# Patient Record
Sex: FEMALE | Race: WHITE | Employment: OTHER | URBAN - METROPOLITAN AREA
[De-identification: names, ages, dates, MRNs, and addresses within clinical notes are randomized per-mention and may not be internally consistent; named-entity substitution may affect disease eponyms.]

---

## 2022-01-18 ENCOUNTER — HOSPITAL ENCOUNTER (EMERGENCY)
Age: 52
Discharge: HOME | End: 2022-01-19
Attending: EMERGENCY MEDICINE
Payer: COMMERCIAL

## 2022-01-18 ENCOUNTER — APPOINTMENT (OUTPATIENT)
Dept: GENERAL RADIOLOGY | Age: 52
End: 2022-01-18
Payer: COMMERCIAL

## 2022-01-18 VITALS
TEMPERATURE: 98.9 F | DIASTOLIC BLOOD PRESSURE: 87 MMHG | SYSTOLIC BLOOD PRESSURE: 128 MMHG | RESPIRATION RATE: 22 BRPM | WEIGHT: 191 LBS | HEART RATE: 88 BPM | HEIGHT: 68 IN | BODY MASS INDEX: 28.95 KG/M2 | OXYGEN SATURATION: 94 %

## 2022-01-18 DIAGNOSIS — U07.1 COVID-19: Primary | ICD-10-CM

## 2022-01-18 LAB
ABSOLUTE EOS #: 0.07 K/UL (ref 0–0.44)
ABSOLUTE IMMATURE GRANULOCYTE: <0.03 K/UL (ref 0–0.3)
ABSOLUTE LYMPH #: 1.18 K/UL (ref 1.1–3.7)
ABSOLUTE MONO #: 0.41 K/UL (ref 0.1–1.2)
ALBUMIN SERPL-MCNC: 4.8 G/DL (ref 3.5–5.2)
ALBUMIN/GLOBULIN RATIO: 2.3 (ref 1–2.5)
ALP BLD-CCNC: 99 U/L (ref 35–104)
ALT SERPL-CCNC: 26 U/L (ref 5–33)
ANION GAP SERPL CALCULATED.3IONS-SCNC: 12 MMOL/L (ref 9–17)
AST SERPL-CCNC: 20 U/L
BASOPHILS # BLD: 1 % (ref 0–2)
BASOPHILS ABSOLUTE: <0.03 K/UL (ref 0–0.2)
BILIRUB SERPL-MCNC: 0.32 MG/DL (ref 0.3–1.2)
BUN BLDV-MCNC: 17 MG/DL (ref 6–20)
BUN/CREAT BLD: ABNORMAL (ref 9–20)
CALCIUM SERPL-MCNC: 9.5 MG/DL (ref 8.6–10.4)
CHLORIDE BLD-SCNC: 103 MMOL/L (ref 98–107)
CO2: 25 MMOL/L (ref 20–31)
CREAT SERPL-MCNC: 0.78 MG/DL (ref 0.5–0.9)
D-DIMER QUANTITATIVE: 0.24 MG/L FEU
DIFFERENTIAL TYPE: NORMAL
EOSINOPHILS RELATIVE PERCENT: 2 % (ref 1–4)
GFR AFRICAN AMERICAN: >60 ML/MIN
GFR NON-AFRICAN AMERICAN: >60 ML/MIN
GFR SERPL CREATININE-BSD FRML MDRD: ABNORMAL ML/MIN/{1.73_M2}
GFR SERPL CREATININE-BSD FRML MDRD: ABNORMAL ML/MIN/{1.73_M2}
GLUCOSE BLD-MCNC: 141 MG/DL (ref 70–99)
HCT VFR BLD CALC: 41.1 % (ref 36.3–47.1)
HEMOGLOBIN: 13.9 G/DL (ref 11.9–15.1)
IMMATURE GRANULOCYTES: 0 %
LYMPHOCYTES # BLD: 32 % (ref 24–43)
MCH RBC QN AUTO: 29.6 PG (ref 25.2–33.5)
MCHC RBC AUTO-ENTMCNC: 33.8 G/DL (ref 28.4–34.8)
MCV RBC AUTO: 87.6 FL (ref 82.6–102.9)
MONOCYTES # BLD: 11 % (ref 3–12)
NRBC AUTOMATED: 0 PER 100 WBC
PDW BLD-RTO: 12.3 % (ref 11.8–14.4)
PLATELET # BLD: 215 K/UL (ref 138–453)
PLATELET ESTIMATE: NORMAL
PMV BLD AUTO: 10.1 FL (ref 8.1–13.5)
POTASSIUM SERPL-SCNC: 3.9 MMOL/L (ref 3.7–5.3)
RBC # BLD: 4.69 M/UL (ref 3.95–5.11)
RBC # BLD: NORMAL 10*6/UL
SARS-COV-2, RAPID: DETECTED
SEG NEUTROPHILS: 54 % (ref 36–65)
SEGMENTED NEUTROPHILS ABSOLUTE COUNT: 1.97 K/UL (ref 1.5–8.1)
SODIUM BLD-SCNC: 140 MMOL/L (ref 135–144)
SPECIMEN DESCRIPTION: ABNORMAL
TOTAL PROTEIN: 6.9 G/DL (ref 6.4–8.3)
TROPONIN INTERP: NORMAL
TROPONIN T: NORMAL NG/ML
TROPONIN, HIGH SENSITIVITY: <6 NG/L (ref 0–14)
WBC # BLD: 3.7 K/UL (ref 3.5–11.3)
WBC # BLD: NORMAL 10*3/UL

## 2022-01-18 PROCEDURE — 84484 ASSAY OF TROPONIN QUANT: CPT

## 2022-01-18 PROCEDURE — 85379 FIBRIN DEGRADATION QUANT: CPT

## 2022-01-18 PROCEDURE — 87635 SARS-COV-2 COVID-19 AMP PRB: CPT

## 2022-01-18 PROCEDURE — 6360000002 HC RX W HCPCS: Performed by: STUDENT IN AN ORGANIZED HEALTH CARE EDUCATION/TRAINING PROGRAM

## 2022-01-18 PROCEDURE — 80053 COMPREHEN METABOLIC PANEL: CPT

## 2022-01-18 PROCEDURE — 96375 TX/PRO/DX INJ NEW DRUG ADDON: CPT

## 2022-01-18 PROCEDURE — 2580000003 HC RX 258: Performed by: STUDENT IN AN ORGANIZED HEALTH CARE EDUCATION/TRAINING PROGRAM

## 2022-01-18 PROCEDURE — 71045 X-RAY EXAM CHEST 1 VIEW: CPT

## 2022-01-18 PROCEDURE — 96374 THER/PROPH/DIAG INJ IV PUSH: CPT

## 2022-01-18 PROCEDURE — 85025 COMPLETE CBC W/AUTO DIFF WBC: CPT

## 2022-01-18 PROCEDURE — 93005 ELECTROCARDIOGRAM TRACING: CPT | Performed by: STUDENT IN AN ORGANIZED HEALTH CARE EDUCATION/TRAINING PROGRAM

## 2022-01-18 PROCEDURE — 99285 EMERGENCY DEPT VISIT HI MDM: CPT

## 2022-01-18 PROCEDURE — 6370000000 HC RX 637 (ALT 250 FOR IP): Performed by: STUDENT IN AN ORGANIZED HEALTH CARE EDUCATION/TRAINING PROGRAM

## 2022-01-18 RX ORDER — FENTANYL CITRATE 50 UG/ML
25 INJECTION, SOLUTION INTRAMUSCULAR; INTRAVENOUS ONCE
Status: COMPLETED | OUTPATIENT
Start: 2022-01-18 | End: 2022-01-18

## 2022-01-18 RX ORDER — ASPIRIN 81 MG/1
324 TABLET, CHEWABLE ORAL ONCE
Status: COMPLETED | OUTPATIENT
Start: 2022-01-18 | End: 2022-01-18

## 2022-01-18 RX ORDER — MORPHINE SULFATE 4 MG/ML
2 INJECTION, SOLUTION INTRAMUSCULAR; INTRAVENOUS ONCE
Status: DISCONTINUED | OUTPATIENT
Start: 2022-01-18 | End: 2022-01-18

## 2022-01-18 RX ORDER — BIOTIN 10 MG
1 TABLET ORAL DAILY
Qty: 30 TABLET | Refills: 0 | Status: SHIPPED | OUTPATIENT
Start: 2022-01-18

## 2022-01-18 RX ORDER — ONDANSETRON 2 MG/ML
4 INJECTION INTRAMUSCULAR; INTRAVENOUS ONCE
Status: COMPLETED | OUTPATIENT
Start: 2022-01-18 | End: 2022-01-18

## 2022-01-18 RX ORDER — BENZONATATE 100 MG/1
100 CAPSULE ORAL 3 TIMES DAILY PRN
Qty: 21 CAPSULE | Refills: 0 | Status: SHIPPED | OUTPATIENT
Start: 2022-01-18 | End: 2022-01-25

## 2022-01-18 RX ORDER — 0.9 % SODIUM CHLORIDE 0.9 %
1000 INTRAVENOUS SOLUTION INTRAVENOUS ONCE
Status: COMPLETED | OUTPATIENT
Start: 2022-01-18 | End: 2022-01-18

## 2022-01-18 RX ORDER — 0.9 % SODIUM CHLORIDE 0.9 %
500 INTRAVENOUS SOLUTION INTRAVENOUS ONCE
Status: DISCONTINUED | OUTPATIENT
Start: 2022-01-18 | End: 2022-01-19

## 2022-01-18 RX ORDER — OXYCODONE HYDROCHLORIDE AND ACETAMINOPHEN 5; 325 MG/1; MG/1
1 TABLET ORAL ONCE
Status: COMPLETED | OUTPATIENT
Start: 2022-01-18 | End: 2022-01-18

## 2022-01-18 RX ORDER — LIDOCAINE 50 MG/G
1 PATCH TOPICAL DAILY
Qty: 30 PATCH | Refills: 0 | Status: SHIPPED | OUTPATIENT
Start: 2022-01-18

## 2022-01-18 RX ORDER — GUAIFENESIN/DEXTROMETHORPHAN 100-10MG/5
10 SYRUP ORAL 3 TIMES DAILY PRN
Qty: 118 ML | Refills: 0 | Status: SHIPPED | OUTPATIENT
Start: 2022-01-18 | End: 2022-01-28

## 2022-01-18 RX ORDER — ACETAMINOPHEN 500 MG
500 TABLET ORAL 4 TIMES DAILY PRN
Qty: 40 TABLET | Refills: 0 | Status: SHIPPED | OUTPATIENT
Start: 2022-01-18 | End: 2022-01-28

## 2022-01-18 RX ADMIN — FENTANYL CITRATE 25 MCG: 50 INJECTION, SOLUTION INTRAMUSCULAR; INTRAVENOUS at 21:27

## 2022-01-18 RX ADMIN — ONDANSETRON 4 MG: 2 INJECTION INTRAMUSCULAR; INTRAVENOUS at 21:27

## 2022-01-18 RX ADMIN — SODIUM CHLORIDE 1000 ML: 9 INJECTION, SOLUTION INTRAVENOUS at 21:49

## 2022-01-18 RX ADMIN — OXYCODONE HYDROCHLORIDE AND ACETAMINOPHEN 1 TABLET: 5; 325 TABLET ORAL at 22:47

## 2022-01-18 RX ADMIN — ASPIRIN 324 MG: 81 TABLET, CHEWABLE ORAL at 21:27

## 2022-01-18 ASSESSMENT — ENCOUNTER SYMPTOMS
CONSTIPATION: 0
COUGH: 1
BACK PAIN: 1
RHINORRHEA: 1
NAUSEA: 1
DIARRHEA: 0
ABDOMINAL PAIN: 0
VOMITING: 0
SHORTNESS OF BREATH: 1
SORE THROAT: 0

## 2022-01-18 ASSESSMENT — PAIN DESCRIPTION - DESCRIPTORS: DESCRIPTORS: CONSTANT

## 2022-01-18 ASSESSMENT — PAIN SCALES - GENERAL
PAINLEVEL_OUTOF10: 6
PAINLEVEL_OUTOF10: 7
PAINLEVEL_OUTOF10: 8

## 2022-01-18 ASSESSMENT — PAIN DESCRIPTION - LOCATION: LOCATION: BACK;CHEST

## 2022-01-19 ENCOUNTER — TELEPHONE (OUTPATIENT)
Dept: FAMILY MEDICINE CLINIC | Age: 52
End: 2022-01-19

## 2022-01-19 NOTE — ED PROVIDER NOTES
[facility]  ED  Emergency Department Encounter  Emergency Medicine Resident     Pt Name: Trey De Los Santos  MRN: [id number]  Celinagfmarguerite 1970  Date of evaluation: 1/18/22  PCP:  MALCOLM Reyna CNP    CHIEF COMPLAINT       Chief Complaint   Patient presents with    Chest Pain     stated she has been hurting since this morning    Back Pain       HISTORY OFPRESENT ILLNESS  (Location/Symptom, Timing/Onset, Context/Setting, Quality, Duration, Modifying Factors,Severity.)      Trey De Los Santos is a 46 y.o. female with past medical history of hypertension and multiple sclerosis who presents with fever, headache, rhinorrhea, congestion, myalgias, nausea and shortness of breath for the past 5 days. Patient reports that she has been treating herself at home with over-the-counter medications and doing well until this morning when she woke up with chest pain. She describes the pain as pressure from both the front of her chest and her back. States the pain has been present all day so she decided to present to the emergency department for evaluation. She denies prior cardiac or lung history. She does report recent COVID exposure to her son. Patient is not vaccinated for COVID. She also reports a history of DVT for which she was on anticoagulation for 1 year but has not been on it since. She denies history of PE, cancer, recent surgery or other prolonged immobilization. She is not taking hormones. She is not a current smoker    PAST MEDICAL / SURGICAL / SOCIAL / FAMILY HISTORY      has a past medical history of Anxiety, Depression, Hypertension, Kidney stones, Multiple sclerosis ([address] and Osteoporosis. has a past surgical history that includes Hysterectomy (2005); Tonsillectomy and adenoidectomy (1978); Cholecystectomy (2000); Appendectomy (2001); and Kidney stone surgery (2014). Social:  reports that she is a non-smoker but has been exposed to tobacco smoke.  She has never used smokeless tobacco. She reports that she does not drink alcohol and does not use drugs. Family Hx:   Family History   Problem Relation Age of Onset    Hypertension Mother     Diabetes Mother     Thyroid Disease Mother     Mult Sclerosis Father     Kidney Disease Maternal Grandmother         Allergies:  Bee venom, Tigan [trimethobenzamide hcl], Toprol xl [metoprolol], Toradol [ketorolac tromethamine], and Imitrex [sumatriptan]    Home Medications:  Prior to Admission medications    Medication Sig Start Date End Date Taking? Authorizing Provider   acetaminophen (TYLENOL) 500 MG tablet Take 1 tablet by mouth 4 times daily as needed for Pain 1/18/22 1/28/22 Yes Omari Rogel, DO   guaiFENesin-dextromethorphan (ROBITUSSIN DM) 100-10 MG/5ML syrup Take 10 mLs by mouth 3 times daily as needed for Cough 1/18/22 1/28/22 Yes Omari Rogel, DO   benzonatate (TESSALON PERLES) 100 MG capsule Take 1 capsule by mouth 3 times daily as needed for Cough 1/18/22 1/25/22 Yes Arabella Senior, DO   lidocaine (LIDODERM) 5 % Place 1 patch onto the skin daily 12 hours on, 12 hours off. 1/18/22  Yes Omari Rogel, DO   Multiple Vitamins-Minerals (MULTIVITAMIN ADULT) CHEW Take 1 tablet by mouth daily 1/18/22  Yes Omari Rogel, DO   vitamin D (CHOLECALCIFEROL) 1000 UNIT TABS tablet Take 1,000 Units by mouth daily    Historical Provider, MD   lisinopril (PRINIVIL;ZESTRIL) 20 MG tablet   Take 20 mg by mouth 2 times daily  4/22/15   Historical Provider, MD   amphetamine-dextroamphetamine (ADDERALL, 10MG,) 10 MG tablet   Take 20 mg by mouth daily     Historical Provider, MD   escitalopram (LEXAPRO) 10 MG tablet Take 10 mg by mouth 2 times daily. Historical Provider, MD       REVIEW OFSYSTEMS    (2-9 systems for level 4, 10 or more for level 5)      Review of Systems   Constitutional: Positive for appetite change, fatigue and fever. Negative for chills. HENT: Positive for congestion and rhinorrhea. Negative for sore throat.     Eyes: Negative for visual disturbance. Respiratory: Positive for cough and shortness of breath. Cardiovascular: Positive for chest pain. Negative for leg swelling. Gastrointestinal: Positive for nausea. Negative for abdominal pain, constipation, diarrhea and vomiting. Genitourinary: Negative for dysuria, frequency and hematuria. Musculoskeletal: Positive for back pain and myalgias. Negative for arthralgias and neck pain. Skin: Negative for rash. Neurological: Negative for weakness, light-headedness, numbness and headaches. Psychiatric/Behavioral: Negative for confusion. All other systems reviewed and are negative. PHYSICAL EXAM   (up to 7 for level 4, 8 or more forlevel 5)      INITIAL VITALS:   Vitals:    01/18/22 2036 01/18/22 2150 01/18/22 2158 01/18/22 2240   BP: 122/81  128/87    Pulse: 107 102 88    Resp: 18  22    Temp: 100.8 °F (38.2 °C)   98.9 °F (37.2 °C)   TempSrc:    Oral   SpO2: 95%  94%    Weight: 191 lb (86.6 kg)      Height: 5' 8\" (1.727 m)           Physical Exam  Vitals and nursing note reviewed. Constitutional:       General: She is not in acute distress. Appearance: She is not toxic-appearing. Comments: Adult female sitting in stretcher no acute distress. Speaks in full sentences and answers questions appropriately. HENT:      Head: Normocephalic and atraumatic. Nose: Nose normal.      Mouth/Throat:      Mouth: Mucous membranes are moist.      Pharynx: Oropharynx is clear. Eyes:      Conjunctiva/sclera: Conjunctivae normal.      Pupils: Pupils are equal, round, and reactive to light. Cardiovascular:      Rate and Rhythm: Regular rhythm. Tachycardia present. Heart sounds: No murmur heard. No friction rub. No gallop. Pulmonary:      Effort: No respiratory distress. Breath sounds: Normal breath sounds. No wheezing, rhonchi or rales. Comments: Mild tachypnea but clear breath sounds throughout  Abdominal:      General: Abdomen is flat.  There is no distension. Palpations: Abdomen is soft. Tenderness: There is no abdominal tenderness. Musculoskeletal:         General: No swelling or tenderness. Cervical back: Neck supple. No rigidity. Right lower leg: No edema. Left lower leg: No edema. Skin:     General: Skin is warm and dry. Capillary Refill: Capillary refill takes less than 2 seconds. Findings: No rash. Neurological:      General: No focal deficit present. Mental Status: She is alert. Psychiatric:         Mood and Affect: Mood normal.         Behavior: Behavior normal.         DIFFERENTIAL  DIAGNOSIS     DDX: COVID-19, other viral illness, pneumonia, pleural effusion, pulmonary edema, pulmonary embolism, ACS, musculoskeletal pain, electrolyte abnormality, dehydration, MS flare    Initial MDM/Plan: 46 y.o. female with past medical history of hypertension and multiple sclerosis who presents with fever, headache, rhinorrhea, congestion, myalgias, nausea and shortness of breath for the past 5 days with new onset chest pain today. Patient had recent COVID exposure with her son. On physical exam, patient is awake, alert, nontoxic-appearing. Her vitals are remarkable for tachycardia and temp of 100.8F. O2 sats ranging from 94-98% on room air. Lungs are clear. Suspect COVID-19. However, given chest pain, will obtain cardiac work-up including EKG, troponin and chest x-ray. Also obtain D-dimer to evaluate for pulmonary embolism. Will treat symptomatically and reassess    DIAGNOSTIC RESULTS / EMERGENCYDEPARTMENT COURSE / MDM     LABS:  Results for orders placed or performed during the hospital encounter of 01/18/22   COVID-19, Rapid    Specimen: Nasopharyngeal Swab   Result Value Ref Range    Specimen Description . NASOPHARYNGEAL SWAB     SARS-CoV-2, Rapid DETECTED (A) Not Detected   CBC Auto Differential   Result Value Ref Range    WBC 3.7 3.5 - 11.3 k/uL    RBC 4.69 3.95 - 5.11 m/uL    Hemoglobin 13.9 11.9 - 15.1 g/dL    Hematocrit 41.1 36.3 - 47.1 %    MCV 87.6 82.6 - 102.9 fL    MCH 29.6 25.2 - 33.5 pg    MCHC 33.8 28.4 - 34.8 g/dL    RDW 12.3 11.8 - 14.4 %    Platelets 920 184 - 034 k/uL    MPV 10.1 8.1 - 13.5 fL    NRBC Automated 0.0 0.0 per 100 WBC    Differential Type NOT REPORTED     Seg Neutrophils 54 36 - 65 %    Lymphocytes 32 24 - 43 %    Monocytes 11 3 - 12 %    Eosinophils % 2 1 - 4 %    Basophils 1 0 - 2 %    Immature Granulocytes 0 0 %    Segs Absolute 1.97 1.50 - 8.10 k/uL    Absolute Lymph # 1.18 1.10 - 3.70 k/uL    Absolute Mono # 0.41 0.10 - 1.20 k/uL    Absolute Eos # 0.07 0.00 - 0.44 k/uL    Basophils Absolute <0.03 0.00 - 0.20 k/uL    Absolute Immature Granulocyte <0.03 0.00 - 0.30 k/uL    WBC Morphology NOT REPORTED     RBC Morphology NOT REPORTED     Platelet Estimate NOT REPORTED    Comprehensive Metabolic Panel w/ Reflex to MG   Result Value Ref Range    Glucose 141 (H) 70 - 99 mg/dL    BUN 17 6 - 20 mg/dL    CREATININE 0.78 0.50 - 0.90 mg/dL    Bun/Cre Ratio NOT REPORTED 9 - 20    Calcium 9.5 8.6 - 10.4 mg/dL    Sodium 140 135 - 144 mmol/L    Potassium 3.9 3.7 - 5.3 mmol/L    Chloride 103 98 - 107 mmol/L    CO2 25 20 - 31 mmol/L    Anion Gap 12 9 - 17 mmol/L    Alkaline Phosphatase 99 35 - 104 U/L    ALT 26 5 - 33 U/L    AST 20 <32 U/L    Total Bilirubin 0.32 0.3 - 1.2 mg/dL    Total Protein 6.9 6.4 - 8.3 g/dL    Albumin 4.8 3.5 - 5.2 g/dL    Albumin/Globulin Ratio 2.3 1.0 - 2.5    GFR Non-African American >60 >60 mL/min    GFR African American >60 >60 mL/min    GFR Comment          GFR Staging NOT REPORTED    D-Dimer, Quantitative   Result Value Ref Range    D-Dimer, Quant 0.24 mg/L FEU   Troponin   Result Value Ref Range    Troponin, High Sensitivity <6 0 - 14 ng/L    Troponin T NOT REPORTED <0.03 ng/mL    Troponin Interp NOT REPORTED          RADIOLOGY:  XR CHEST PORTABLE    Result Date: 1/18/2022  EXAMINATION: ONE XRAY VIEW OF THE CHEST 1/18/2022 9:30 pm COMPARISON: None.  HISTORY: ORDERING SYSTEM PROVIDED HISTORY: chest pain TECHNOLOGIST PROVIDED HISTORY: chest pain FINDINGS: The lungs are without acute focal process. There is no effusion or pneumothorax. The cardiomediastinal silhouette is without acute process. The osseous structures are without acute process. No acute process. EKG  Sinus tachycardia, rate: 102  UT: 140  QRS: 1  QT/QTc: 344/440  No ST elevation or depression  No T wave abnormality      All EKG's are interpreted by the Emergency Department Physician who either signs or Co-signs this chart in the absence of a cardiologist.    MEDICATIONS ORDERED:  Orders Placed This Encounter   Medications    0.9 % sodium chloride bolus    aspirin chewable tablet 324 mg    ondansetron (ZOFRAN) injection 4 mg    fentaNYL (SUBLIMAZE) injection 25 mcg    DISCONTD: morphine injection 2 mg    oxyCODONE-acetaminophen (PERCOCET) 5-325 MG per tablet 1 tablet    DISCONTD: 0.9 % sodium chloride bolus    acetaminophen (TYLENOL) 500 MG tablet     Sig: Take 1 tablet by mouth 4 times daily as needed for Pain     Dispense:  40 tablet     Refill:  0    guaiFENesin-dextromethorphan (ROBITUSSIN DM) 100-10 MG/5ML syrup     Sig: Take 10 mLs by mouth 3 times daily as needed for Cough     Dispense:  118 mL     Refill:  0    benzonatate (TESSALON PERLES) 100 MG capsule     Sig: Take 1 capsule by mouth 3 times daily as needed for Cough     Dispense:  21 capsule     Refill:  0    lidocaine (LIDODERM) 5 %     Sig: Place 1 patch onto the skin daily 12 hours on, 12 hours off.      Dispense:  30 patch     Refill:  0    Multiple Vitamins-Minerals (MULTIVITAMIN ADULT) CHEW     Sig: Take 1 tablet by mouth daily     Dispense:  30 tablet     Refill:  0         PROCEDURES:  None      CONSULTS:  None      EMERGENCY DEPARTMENT COURSE:  ED Course as of 01/19/22 0527 Tue Jan 18, 2022 2148 WBC: 3.7 [KA]   2154 SARS-CoV-2, Rapid(!): DETECTED [KA]   2159 D-Dimer, Quant: 0.24 [KA]   2232 Troponin, High Sensitivity: <6 [BV]   9580 Patient reevaluated. Reports pain in chest has improved. O2 sat still 94-98% on room air. Tachycardia improved heart rate now in the 80s. Marched patient around room and she did not have any changes in vitals, however did report increased pain in her back. She is now reporting left flank pain which she feels is similar to her previous kidney stones which have required surgical removal.  On exam, patient does have left flank tenderness to percussion. She has an allergy to Toradol so we will treat with morphine. Will obtain urinalysis. If urine is concerning for an infected stone, will discuss risk and benefits of admission and start the patient on antibiotics. [KA]   Wed Jan 19, 2022   0022 Unable to get urine. She was bladder scanned and had to 226 cc of urine in her bladder. Patient was offered he will continue to [facility] emergency department and how she urinated versus discharge for straight cath. Patient elected to be discharged at this time with follow-up with her primary care physician and strict return precautions to return to the emergency department for any worsening symptoms, especially decreased urination, hematuria, flank pain, fevers that do not decrease with Tylenol and nausea vomiting. Discussed with patient that I think an infected stone is an unlikely cause of her symptoms at this time, as her heart rate and temperature improved significantly with treatment here. Her symptoms are more likely due to COVID.    [KA]      ED Course User Index  [KA] David Humphrey DO          FINAL IMPRESSION      1. COVID-19          DISPOSITION / PLAN     DISPOSITION  Discharge      PATIENT REFERRED TO:  Iman Germain, APRN - CNP  [address] [address]  [phone]    Schedule an appointment as soon as possible for a visit       [facility] ED  [address] [place] [address]  [phone]    If symptoms worsen      DISCHARGE MEDICATIONS:  Discharge Medication List as of 1/19/2022 12:24 AM      START taking these medications    Details   acetaminophen (TYLENOL) 500 MG tablet Take 1 tablet by mouth 4 times daily as needed for Pain, Disp-40 tablet, R-0Print      guaiFENesin-dextromethorphan (ROBITUSSIN DM) 100-10 MG/5ML syrup Take 10 mLs by mouth 3 times daily as needed for Cough, Disp-118 mL, R-0Print      benzonatate (TESSALON PERLES) 100 MG capsule Take 1 capsule by mouth 3 times daily as needed for Cough, Disp-21 capsule, R-0Print      lidocaine (LIDODERM) 5 % Place 1 patch onto the skin daily 12 hours on, 12 hours off., Disp-30 patch, R-0Print      Multiple Vitamins-Minerals (MULTIVITAMIN ADULT) CHEW Take 1 tablet by mouth daily, Disp-30 tablet, R-0Print             Cecilia Whitlock DO  Emergency Medicine Resident    (Please note that portions of this note were completed with a voice recognition program.Efforts were made to edit the dictations but occasionally words are mis-transcribed.)        Cecilia Whitlock DO  Resident  01/19/22 7403

## 2022-01-19 NOTE — TELEPHONE ENCOUNTER
[facility] ([facility]) ED Follow up Call    Reason for ED visit:  CHEST PAIN, BACK PAIN       FU appts/Provider:    No future appointments. VOICEMAIL DOCUMENTATION - ERASE IF NOT USED  Hi, this message is for  ALBER  This is MELANIA from [address] [place] [address] office. Just calling to see how you are doing after your recent visit to the Emergency Room. [address] [place] [address] wants to make sure you were able to fill any prescriptions and that you understand your discharge instructions. Please return our call if you need to make a follow up appointment with your provider or have any further needs. Our phone number is [phone]*. Have a great day.

## 2022-01-19 NOTE — ED PROVIDER NOTES
[facility]     Emergency Department     Faculty Attestation    I performed a history and physical examination of the patient and discussed management with the resident. I reviewed the residents note and agree with the documented findings and plan of care. Any areas of disagreement are noted on the chart. I was personally present for the key portions of any procedures. I have documented in the chart those procedures where I was not present during the key portions. I have reviewed the emergency nurses triage note. I agree with the chief complaint, past medical history, past surgical history, allergies, medications, social and family history as documented unless otherwise noted below. For Physician Assistant/ Nurse Practitioner cases/documentation I have personally evaluated this patient and have completed at least one if not all key elements of the E/M (history, physical exam, and MDM). Additional findings are as noted. I have personally seen and evaluated the patient. I find the patient's history and physical exam are consistent with the NP/PA documentation. I agree with the care provided, treatment rendered, disposition and follow-up plan. This patient was evaluated in the Emergency Department for symptoms described in the history of present illness. The patient was evaluated in the context of the global COVID-19 pandemic, which necessitated consideration that the patient might be at risk for infection with the SARS-CoV-2 virus that causes COVID-19. Institutional protocols and algorithms that pertain to the evaluation of patients at risk for COVID-19 are in a state of rapid change based on information released by regulatory bodies including the CDC and federal and state organizations. These policies and algorithms were followed during the patient's care in the ED. 12-year-old female with hypertension and multiple sclerosis presenting with concerns for COVID.   Recent exposure to her son. Not vaccinated. Doing well at home over the last several days, using over-the-counter medications, however woke up with chest pressure that feels like it goes through to her back. It did not improve throughout the day. She has also lost taste and smell senses. Exam:  General: Laying on the bed, awake, alert and in no acute distress  CV: normal rate and regular rhythm  Lungs: Breathing comfortably on room air with no tachypnea, hypoxia, or increased work of breathing    Plan:  COVID swab, cardiac work-up, D-dimer. Patient positive for COVID. X-ray with no pneumonia. D-dimer not elevated. Troponin negative. Patient with no hypoxia, safe for discharge home from [address] [place] [address]. Patient started having flank pain during work-up, awaiting urine as patient has had kidney stones in the past.  Signed out to overnight physician pending urine results.         Aida Banegas MD   Attending Emergency  Physician    (Please note that portions of this note were completed with a voice recognition program. Efforts were made to edit the dictations but occasionally words are mis-transcribed.)              Aida Banegas MD  01/18/22 5922

## 2022-01-19 NOTE — ED PROVIDER NOTES
Faculty Sign-Out Attestation  Handoff taken on the following patient from prior Attending Physician: Flip Frey    I was available and discussed any additional care issues that arose and coordinated the management plans with the resident(s) caring for the patient during my duty period. Any areas of disagreement with residents documentation of care or procedures are noted on the chart. I was personally present for the key portions of any/all procedures during my duty period. I have documented in the chart those procedures where I was not present during the key portions.     Cp / covid +, d dimer-, ua pending,   Will plan to discharge    Laurie Lang DO  Attending Physician     Laurie Lang,   01/18/22 6605    Pt declines urine, risk discussed,   Pt discharged, [address], DO  01/19/22 9917

## 2022-01-19 NOTE — ED NOTES
Pt ambulatory to ED 18. Pt is a.o x4. Pt c/o chest pressure, fatigue, cough, fever, loss of smell/taste. Pt put on cardiac monitor. IV established, EKG, covid swab and labs obtained. Call light within reach. NAD noted. Will continue to monitor.      Alfonso Buitrago RN  01/18/22 7985

## 2022-01-20 LAB
EKG ATRIAL RATE: 102 BPM
EKG P AXIS: 17 DEGREES
EKG P-R INTERVAL: 144 MS
EKG Q-T INTERVAL: 344 MS
EKG QRS DURATION: 102 MS
EKG QTC CALCULATION (BAZETT): 448 MS
EKG R AXIS: -12 DEGREES
EKG T AXIS: 45 DEGREES
EKG VENTRICULAR RATE: 102 BPM

## 2022-01-20 PROCEDURE — 93010 ELECTROCARDIOGRAM REPORT: CPT | Performed by: INTERNAL MEDICINE

## 2022-11-12 ENCOUNTER — HOSPITAL ENCOUNTER (EMERGENCY)
Age: 52
Discharge: HOME | End: 2022-11-12
Attending: EMERGENCY MEDICINE
Payer: COMMERCIAL

## 2022-11-12 VITALS
DIASTOLIC BLOOD PRESSURE: 93 MMHG | HEART RATE: 80 BPM | RESPIRATION RATE: 18 BRPM | BODY MASS INDEX: 30.31 KG/M2 | WEIGHT: 200 LBS | SYSTOLIC BLOOD PRESSURE: 141 MMHG | OXYGEN SATURATION: 99 % | HEIGHT: 68 IN | TEMPERATURE: 99 F

## 2022-11-12 DIAGNOSIS — K25.9 GASTRIC ULCER WITHOUT HEMORRHAGE OR PERFORATION, UNSPECIFIED CHRONICITY: ICD-10-CM

## 2022-11-12 DIAGNOSIS — R19.7 DIARRHEA, UNSPECIFIED TYPE: Primary | ICD-10-CM

## 2022-11-12 DIAGNOSIS — R10.13 EPIGASTRIC PAIN: ICD-10-CM

## 2022-11-12 LAB
ABSOLUTE EOS #: 0.09 K/UL (ref 0–0.44)
ABSOLUTE IMMATURE GRANULOCYTE: <0.03 K/UL (ref 0–0.3)
ABSOLUTE LYMPH #: 2.55 K/UL (ref 1.1–3.7)
ABSOLUTE MONO #: 0.44 K/UL (ref 0.1–1.2)
ALBUMIN SERPL-MCNC: 4.8 G/DL (ref 3.5–5.2)
ALBUMIN/GLOBULIN RATIO: 1.5 (ref 1–2.5)
ALP BLD-CCNC: 82 U/L (ref 35–104)
ALT SERPL-CCNC: 33 U/L (ref 5–33)
ANION GAP SERPL CALCULATED.3IONS-SCNC: 14 MMOL/L (ref 9–17)
AST SERPL-CCNC: 32 U/L
BASOPHILS # BLD: 1 % (ref 0–2)
BASOPHILS ABSOLUTE: 0.08 K/UL (ref 0–0.2)
BILIRUB SERPL-MCNC: 0.8 MG/DL (ref 0.3–1.2)
BUN BLDV-MCNC: 14 MG/DL (ref 6–20)
CALCIUM SERPL-MCNC: 9.6 MG/DL (ref 8.6–10.4)
CHLORIDE BLD-SCNC: 102 MMOL/L (ref 98–107)
CO2: 22 MMOL/L (ref 20–31)
CREAT SERPL-MCNC: 0.62 MG/DL (ref 0.5–0.9)
EOSINOPHILS RELATIVE PERCENT: 1 % (ref 1–4)
FLU A ANTIGEN: NEGATIVE
FLU B ANTIGEN: NEGATIVE
GFR SERPL CREATININE-BSD FRML MDRD: >60 ML/MIN/1.73M2
GLUCOSE BLD-MCNC: 150 MG/DL (ref 70–99)
HCT VFR BLD CALC: 41.2 % (ref 36.3–47.1)
HEMOGLOBIN: 14.2 G/DL (ref 11.9–15.1)
IMMATURE GRANULOCYTES: 0 %
LIPASE: 57 U/L (ref 13–60)
LYMPHOCYTES # BLD: 40 % (ref 24–43)
MCH RBC QN AUTO: 30.5 PG (ref 25.2–33.5)
MCHC RBC AUTO-ENTMCNC: 34.5 G/DL (ref 28.4–34.8)
MCV RBC AUTO: 88.6 FL (ref 82.6–102.9)
MONOCYTES # BLD: 7 % (ref 3–12)
NRBC AUTOMATED: 0 PER 100 WBC
PDW BLD-RTO: 12.3 % (ref 11.8–14.4)
PLATELET # BLD: 256 K/UL (ref 138–453)
PMV BLD AUTO: 10.4 FL (ref 8.1–13.5)
POTASSIUM SERPL-SCNC: 4 MMOL/L (ref 3.7–5.3)
RBC # BLD: 4.65 M/UL (ref 3.95–5.11)
SARS-COV-2, RAPID: NOT DETECTED
SEG NEUTROPHILS: 51 % (ref 36–65)
SEGMENTED NEUTROPHILS ABSOLUTE COUNT: 3.17 K/UL (ref 1.5–8.1)
SODIUM BLD-SCNC: 138 MMOL/L (ref 135–144)
SPECIMEN DESCRIPTION: NORMAL
TOTAL PROTEIN: 8 G/DL (ref 6.4–8.3)
WBC # BLD: 6.4 K/UL (ref 3.5–11.3)

## 2022-11-12 PROCEDURE — 96374 THER/PROPH/DIAG INJ IV PUSH: CPT

## 2022-11-12 PROCEDURE — 2580000003 HC RX 258: Performed by: STUDENT IN AN ORGANIZED HEALTH CARE EDUCATION/TRAINING PROGRAM

## 2022-11-12 PROCEDURE — 80053 COMPREHEN METABOLIC PANEL: CPT

## 2022-11-12 PROCEDURE — 96375 TX/PRO/DX INJ NEW DRUG ADDON: CPT

## 2022-11-12 PROCEDURE — 99284 EMERGENCY DEPT VISIT MOD MDM: CPT

## 2022-11-12 PROCEDURE — 87635 SARS-COV-2 COVID-19 AMP PRB: CPT

## 2022-11-12 PROCEDURE — 85025 COMPLETE CBC W/AUTO DIFF WBC: CPT

## 2022-11-12 PROCEDURE — 87804 INFLUENZA ASSAY W/OPTIC: CPT

## 2022-11-12 PROCEDURE — 6360000002 HC RX W HCPCS: Performed by: STUDENT IN AN ORGANIZED HEALTH CARE EDUCATION/TRAINING PROGRAM

## 2022-11-12 PROCEDURE — 83690 ASSAY OF LIPASE: CPT

## 2022-11-12 PROCEDURE — 2500000003 HC RX 250 WO HCPCS: Performed by: STUDENT IN AN ORGANIZED HEALTH CARE EDUCATION/TRAINING PROGRAM

## 2022-11-12 RX ORDER — 0.9 % SODIUM CHLORIDE 0.9 %
1000 INTRAVENOUS SOLUTION INTRAVENOUS ONCE
Status: COMPLETED | OUTPATIENT
Start: 2022-11-12 | End: 2022-11-12

## 2022-11-12 RX ORDER — AMLODIPINE BESYLATE 10 MG/1
TABLET ORAL
COMMUNITY

## 2022-11-12 RX ORDER — ONDANSETRON 4 MG/1
4 TABLET, ORALLY DISINTEGRATING ORAL EVERY 8 HOURS PRN
Qty: 10 TABLET | Refills: 0 | Status: SHIPPED | OUTPATIENT
Start: 2022-11-12

## 2022-11-12 RX ORDER — FAMOTIDINE 10 MG/ML
20 INJECTION, SOLUTION INTRAVENOUS ONCE
Status: COMPLETED | OUTPATIENT
Start: 2022-11-12 | End: 2022-11-12

## 2022-11-12 RX ORDER — SODIUM CHLORIDE 0.9 % (FLUSH) 0.9 %
3 SYRINGE (ML) INJECTION EVERY 8 HOURS
Status: DISCONTINUED | OUTPATIENT
Start: 2022-11-12 | End: 2022-11-12 | Stop reason: HOSPADM

## 2022-11-12 RX ORDER — ONDANSETRON 2 MG/ML
4 INJECTION INTRAMUSCULAR; INTRAVENOUS ONCE
Status: COMPLETED | OUTPATIENT
Start: 2022-11-12 | End: 2022-11-12

## 2022-11-12 RX ORDER — FENTANYL CITRATE 50 UG/ML
50 INJECTION, SOLUTION INTRAMUSCULAR; INTRAVENOUS ONCE
Status: COMPLETED | OUTPATIENT
Start: 2022-11-12 | End: 2022-11-12

## 2022-11-12 RX ADMIN — ONDANSETRON 4 MG: 2 INJECTION INTRAMUSCULAR; INTRAVENOUS at 14:45

## 2022-11-12 RX ADMIN — FENTANYL CITRATE 50 MCG: 50 INJECTION INTRAMUSCULAR; INTRAVENOUS at 15:27

## 2022-11-12 RX ADMIN — SODIUM CHLORIDE 1000 ML: 9 INJECTION, SOLUTION INTRAVENOUS at 14:39

## 2022-11-12 RX ADMIN — FAMOTIDINE 20 MG: 10 INJECTION, SOLUTION INTRAVENOUS at 15:27

## 2022-11-12 ASSESSMENT — PAIN DESCRIPTION - FREQUENCY: FREQUENCY: CONTINUOUS

## 2022-11-12 ASSESSMENT — ENCOUNTER SYMPTOMS
NAUSEA: 1
VOMITING: 1
ABDOMINAL PAIN: 1
SHORTNESS OF BREATH: 0
EYE DISCHARGE: 0
EYE ITCHING: 0
DIARRHEA: 1
COUGH: 0
RHINORRHEA: 0

## 2022-11-12 ASSESSMENT — PAIN DESCRIPTION - PAIN TYPE: TYPE: ACUTE PAIN

## 2022-11-12 ASSESSMENT — PAIN DESCRIPTION - LOCATION: LOCATION: ABDOMEN

## 2022-11-12 ASSESSMENT — PAIN - FUNCTIONAL ASSESSMENT: PAIN_FUNCTIONAL_ASSESSMENT: 0-10

## 2022-11-12 ASSESSMENT — PAIN SCALES - GENERAL: PAINLEVEL_OUTOF10: 4

## 2022-11-12 NOTE — ED PROVIDER NOTES
[facility]     Emergency Department     Faculty Attestation    I performed a history and physical examination of the patient and discussed management with the resident. I reviewed the residents note and agree with the documented findings and plan of care. Any areas of disagreement are noted on the chart. I was personally present for the key portions of any procedures. I have documented in the chart those procedures where I was not present during the key portions. I have reviewed the emergency nurses triage note. I agree with the chief complaint, past medical history, past surgical history, allergies, medications, social and family history as documented unless otherwise noted below. For Physician Assistant/ Nurse Practitioner cases/documentation I have personally evaluated this patient and have completed at least one if not all key elements of the E/M (history, physical exam, and MDM). Additional findings are as noted. Patient here with headache arthralgias myalgias abdominal pain vomiting diarrhea for 3 days. No respiratory complaints. Significantly elevated blood pressure at home but 141/93 here. On exam nontoxic well-appearing chatting with the nurse. Neck supple. Normal pupils normal mental status. Heart normal lungs clear. Abdomen soft no focal tenderness rebound or guarding except mild epigastric tenderness. No pulsatile mass.   Will hydrate meds labs swabs reevaluate      Critical Care     none    Salome Frankel, MD, gabriela Mcnair  Attending Emergency  Physician           Salome Frankel, MD  11/12/22 2621

## 2022-11-12 NOTE — DISCHARGE INSTRUCTIONS
Your symptoms were improved with Pepcid and fentanyl. You will be discharged with Zofran ODT prescription. He states that she will  Pepcid over-the-counter. Please make sure you pick that up. Please take the medication as recommended.   If it anytime you do have worsening symptoms, please return to emergency room as soon as possible

## 2022-11-12 NOTE — ED PROVIDER NOTES
[facility]  ED  Emergency Department Encounter  Emergency Medicine Resident     Pt Name: Lisa Del Cid  MRN: [id number]  [place] 1970  Date of evaluation: 11/12/22  PCP:  Dexter Medina PA-C    CHIEF COMPLAINT       Chief Complaint   Patient presents with    Abdominal Pain    Emesis    Diarrhea    Headache       HISTORY OFPRESENT ILLNESS  (Location/Symptom, Timing/Onset, Context/Setting, Quality, Duration, Modifying Factors,Severity.)      Lisa Del Cid is a 45 y. o.yo female who presents with ration of symptoms of epigastric abdominal pain, diarrhea, vomiting, headache. Patient states that the abdominal pain is 4 out of 10. She has not had any fever, no cough or rhinorrhea. Patient is states that she is not COVID or flu vaccinated. Patient otherwise denies any chest pain, shortness of breath. Patient states that the reason why she came to evaluated today was because she felt lightheaded and dizzy when she went to go stand up at home. PAST MEDICAL / SURGICAL / SOCIAL / FAMILY HISTORY      has a past medical history of Anxiety, Depression, Hypertension, Kidney stones, Multiple sclerosis ([address] and Osteoporosis. has a past surgical history that includes Hysterectomy (2005); Tonsillectomy and adenoidectomy (1978); Cholecystectomy (2000); Appendectomy (2001); and Kidney stone surgery (2014).      Social History     Socioeconomic History    Marital status: Unknown     Spouse name: Not on file    Number of children: Not on file    Years of education: Not on file    Highest education level: Not on file   Occupational History    Not on file   Tobacco Use    Smoking status: Passive Smoke Exposure - Never Smoker    Smokeless tobacco: Never   Substance and Sexual Activity    Alcohol use: No    Drug use: No    Sexual activity: Never   Other Topics Concern    Not on file   Social History Narrative    Not on file     Social Determinants of Health     Financial Resource Strain: Not on file Food Insecurity: Not on file   Transportation Needs: Not on file   Physical Activity: Not on file   Stress: Not on file   Social Connections: Not on file   Intimate Partner Violence: Not on file   Housing Stability: Not on file       Family History   Problem Relation Age of Onset    Hypertension Mother     Diabetes Mother     Thyroid Disease Mother     Mult Sclerosis Father     Kidney Disease Maternal Grandmother         Allergies:  Bee venom, Tigan [trimethobenzamide hcl], Toprol xl [metoprolol], Toradol [ketorolac tromethamine], and Imitrex [sumatriptan]    Home Medications:  Prior to Admission medications    Medication Sig Start Date End Date Taking? Authorizing Provider   ondansetron (ZOFRAN ODT) 4 MG disintegrating tablet Take 1 tablet by mouth every 8 hours as needed for Nausea 11/12/22  Yes Judah Collier MD   amLODIPine (NORVASC) 10 MG tablet 1 tablet    Historical Provider, MD   acetaminophen (TYLENOL) 500 MG tablet Take 1 tablet by mouth 4 times daily as needed for Pain 1/18/22 1/28/22  Efe Brain, DO   lidocaine (LIDODERM) 5 % Place 1 patch onto the skin daily 12 hours on, 12 hours off. 1/18/22   Efe Brain, DO   Multiple Vitamins-Minerals (MULTIVITAMIN ADULT) CHEW Take 1 tablet by mouth daily 1/18/22   Efe Brain, DO   vitamin D (CHOLECALCIFEROL) 1000 UNIT TABS tablet Take 1,000 Units by mouth daily    Historical Provider, MD   lisinopril (PRINIVIL;ZESTRIL) 20 MG tablet   Take 20 mg by mouth 2 times daily  4/22/15   Historical Provider, MD   amphetamine-dextroamphetamine (ADDERALL, 10MG,) 10 MG tablet   Take 20 mg by mouth daily     Historical Provider, MD   escitalopram (LEXAPRO) 10 MG tablet Take 10 mg by mouth 2 times daily. Historical Provider, MD       REVIEW OFSYSTEMS    (2-9 systems for level 4, 10 or more for level 5)      Review of Systems   Constitutional:  Negative for diaphoresis and fatigue. HENT:  Negative for congestion and rhinorrhea.     Eyes:  Negative for discharge and itching. Respiratory:  Negative for cough and shortness of breath. Cardiovascular:  Negative for chest pain and leg swelling. Gastrointestinal:  Positive for abdominal pain, diarrhea, nausea and vomiting. Genitourinary:  Negative for flank pain and frequency. Musculoskeletal:  Negative for gait problem. Skin:  Negative for rash and wound. Neurological:  Positive for light-headedness. Psychiatric/Behavioral:  Negative for behavioral problems and confusion. PHYSICAL EXAM   (up to 7 for level 4, 8 or more forlevel 5)      INITIAL VITALS:   ED Triage Vitals   BP Temp Temp src Heart Rate Resp SpO2 Height Weight   11/12/22 1359 11/12/22 1359 -- 11/12/22 1359 11/12/22 1403 11/12/22 1359 11/12/22 1359 11/12/22 1359   (!) 141/93 99 °F (37.2 °C)  80 18 99 % 5' 8\" (1.727 m) 200 lb (90.7 kg)       Physical Exam  Constitutional:       Appearance: She is well-developed. HENT:      Head: Normocephalic. Mouth/Throat:      Mouth: Mucous membranes are moist.   Eyes:      Extraocular Movements: Extraocular movements intact. Pupils: Pupils are equal, round, and reactive to light. Cardiovascular:      Rate and Rhythm: Normal rate. Pulmonary:      Effort: Pulmonary effort is normal.   Abdominal:      General: There is no distension. Palpations: Abdomen is soft. Tenderness: There is no abdominal tenderness. Musculoskeletal:         General: No swelling. Normal range of motion. Cervical back: Normal range of motion. No rigidity. Skin:     General: Skin is warm. Neurological:      General: No focal deficit present. Mental Status: She is alert.    Psychiatric:         Mood and Affect: Mood normal.         Behavior: Behavior normal.       DIFFERENTIAL  DIAGNOSIS     PLAN (LABS / IMAGING / EKG):  Orders Placed This Encounter   Procedures    RAPID INFLUENZA A/B ANTIGENS    COVID-19, Rapid    CBC with Diff    CMP    Lipase    Saline lock IV       MEDICATIONS ORDERED:  Orders Placed This Encounter   Medications    DISCONTD: sodium chloride flush 0.9 % injection 3 mL    0.9 % sodium chloride bolus    ondansetron (ZOFRAN) injection 4 mg    famotidine (PEPCID) injection 20 mg    fentaNYL (SUBLIMAZE) injection 50 mcg    ondansetron (ZOFRAN ODT) 4 MG disintegrating tablet     Sig: Take 1 tablet by mouth every 8 hours as needed for Nausea     Dispense:  10 tablet     Refill:  0         Initial MDM/Plan: 45 y.o. female who presents with abdominal pain that is mild. Patient vitals are reviewed, they are within normal limits. Abdomen is mildly tender over the epigastric region with no rebound. We will plan for labs of CBC, metabolic panel to assess for no dysfunction of the electrolytes. We will get lipase. Patient given fluids, Zofran, pain control. We will anticipate discharge. DIAGNOSTIC RESULTS / EMERGENCYDEPARTMENT COURSE / MDM     LABS:  Labs Reviewed   COMPREHENSIVE METABOLIC PANEL - Abnormal; Notable for the following components:       Result Value    Glucose 150 (*)     AST 32 (*)     All other components within normal limits   RAPID INFLUENZA A/B ANTIGENS   COVID-19, RAPID   CBC WITH AUTO DIFFERENTIAL   LIPASE         RADIOLOGY:  No results found. EKG      All EKG's are interpreted by the Emergency Department Physicianwho either signs or Co-signs this chart in the absence of a cardiologist.    EMERGENCY DEPARTMENT COURSE:  ED Course as of 11/12/22 1834   Sat Nov 12, 2022   1517 Pt COVID and Flu negative, electrolytes are within normal limits [AN]   1555 Patient assessed, her symptoms are better. We will discharge her ODT Zofran. She states that she will  over the counter Pepcid. [AN]      ED Course User Index  [AN] Grabiel Martinez MD          PROCEDURES:  None    CONSULTS:  None    CRITICAL CARE:      FINAL IMPRESSION      1. Diarrhea, unspecified type    2. Epigastric pain    3.  Gastric ulcer without hemorrhage or perforation, unspecified chronicity          DISPOSITION / PLAN     DISPOSITION Decision To Discharge 11/12/2022 03:57:28 PM      PATIENT REFERRED TO:  [facility] ED  [address] [address]  [phone]    If symptoms worsen    [place], [place]  [address]  [facility] [address] [facility]  [phone]      As needed    DISCHARGE MEDICATIONS:  Discharge Medication List as of 11/12/2022  3:59 PM        START taking these medications    Details   ondansetron (ZOFRAN ODT) 4 MG disintegrating tablet Take 1 tablet by mouth every 8 hours as needed for Nausea, Disp-10 tablet, R-0Print             Salud Bronson MD  Emergency Medicine Resident    (Please note that portions of this note were completed with a voice recognition program.Efforts were made to edit the dictations but occasionally words are mis-transcribed.)        Salud Bronson MD  Resident  11/12/22 9898

## 2022-11-12 NOTE — ED NOTES
Regular Covid 19 swab taken from right nare, labeled, placed in red dot bag, and handed off to second healthcare worker outside of room for transport to laboratory per hospital policy and procedure. Patient tolerated procedure well.        Lucy Reese RN  11/12/22 8090

## 2022-11-15 ENCOUNTER — TRANSCRIBE ORDERS (OUTPATIENT)
Dept: ADMINISTRATIVE | Age: 52
End: 2022-11-15

## 2022-11-15 DIAGNOSIS — R01.1 HEART MURMUR: Primary | ICD-10-CM

## 2023-01-06 ENCOUNTER — HOSPITAL ENCOUNTER (INPATIENT)
Age: 53
LOS: 3 days | Discharge: HOME | DRG: 690 | End: 2023-01-09
Attending: EMERGENCY MEDICINE | Admitting: PSYCHIATRY & NEUROLOGY
Payer: COMMERCIAL

## 2023-01-06 ENCOUNTER — APPOINTMENT (OUTPATIENT)
Dept: GENERAL RADIOLOGY | Age: 53
DRG: 690 | End: 2023-01-06
Payer: COMMERCIAL

## 2023-01-06 DIAGNOSIS — G35 MULTIPLE SCLEROSIS EXACERBATION (HCC): Primary | ICD-10-CM

## 2023-01-06 LAB
ABSOLUTE EOS #: 0.11 K/UL (ref 0–0.44)
ABSOLUTE IMMATURE GRANULOCYTE: <0.03 K/UL (ref 0–0.3)
ABSOLUTE LYMPH #: 2.38 K/UL (ref 1.1–3.7)
ABSOLUTE MONO #: 0.55 K/UL (ref 0.1–1.2)
ANION GAP SERPL CALCULATED.3IONS-SCNC: 12 MMOL/L (ref 9–17)
BASOPHILS # BLD: 1 % (ref 0–2)
BASOPHILS ABSOLUTE: 0.09 K/UL (ref 0–0.2)
BUN BLDV-MCNC: 12 MG/DL (ref 6–20)
CALCIUM SERPL-MCNC: 9.7 MG/DL (ref 8.6–10.4)
CHLORIDE BLD-SCNC: 101 MMOL/L (ref 98–107)
CO2: 23 MMOL/L (ref 20–31)
CREAT SERPL-MCNC: 0.58 MG/DL (ref 0.5–0.9)
EOSINOPHILS RELATIVE PERCENT: 2 % (ref 1–4)
GFR SERPL CREATININE-BSD FRML MDRD: >60 ML/MIN/1.73M2
GLUCOSE BLD-MCNC: 156 MG/DL (ref 70–99)
HCT VFR BLD CALC: 43.5 % (ref 36.3–47.1)
HEMOGLOBIN: 14.7 G/DL (ref 11.9–15.1)
IMMATURE GRANULOCYTES: 0 %
LYMPHOCYTES # BLD: 35 % (ref 24–43)
MCH RBC QN AUTO: 30.4 PG (ref 25.2–33.5)
MCHC RBC AUTO-ENTMCNC: 33.8 G/DL (ref 28.4–34.8)
MCV RBC AUTO: 90.1 FL (ref 82.6–102.9)
MONOCYTES # BLD: 8 % (ref 3–12)
NRBC AUTOMATED: 0 PER 100 WBC
PDW BLD-RTO: 11.9 % (ref 11.8–14.4)
PLATELET # BLD: 259 K/UL (ref 138–453)
PMV BLD AUTO: 10.8 FL (ref 8.1–13.5)
POTASSIUM SERPL-SCNC: 3.9 MMOL/L (ref 3.7–5.3)
RBC # BLD: 4.83 M/UL (ref 3.95–5.11)
SEG NEUTROPHILS: 54 % (ref 36–65)
SEGMENTED NEUTROPHILS ABSOLUTE COUNT: 3.68 K/UL (ref 1.5–8.1)
SODIUM BLD-SCNC: 136 MMOL/L (ref 135–144)
TROPONIN, HIGH SENSITIVITY: <6 NG/L (ref 0–14)
WBC # BLD: 6.8 K/UL (ref 3.5–11.3)

## 2023-01-06 PROCEDURE — 2580000003 HC RX 258: Performed by: NURSE PRACTITIONER

## 2023-01-06 PROCEDURE — 87086 URINE CULTURE/COLONY COUNT: CPT

## 2023-01-06 PROCEDURE — 87186 SC STD MICRODIL/AGAR DIL: CPT

## 2023-01-06 PROCEDURE — 99222 1ST HOSP IP/OBS MODERATE 55: CPT | Performed by: NURSE PRACTITIONER

## 2023-01-06 PROCEDURE — 1200000000 HC SEMI PRIVATE

## 2023-01-06 PROCEDURE — 99285 EMERGENCY DEPT VISIT HI MDM: CPT

## 2023-01-06 PROCEDURE — 80048 BASIC METABOLIC PNL TOTAL CA: CPT

## 2023-01-06 PROCEDURE — 93005 ELECTROCARDIOGRAM TRACING: CPT | Performed by: STUDENT IN AN ORGANIZED HEALTH CARE EDUCATION/TRAINING PROGRAM

## 2023-01-06 PROCEDURE — 85025 COMPLETE CBC W/AUTO DIFF WBC: CPT

## 2023-01-06 PROCEDURE — 6370000000 HC RX 637 (ALT 250 FOR IP): Performed by: PSYCHIATRY & NEUROLOGY

## 2023-01-06 PROCEDURE — 87077 CULTURE AEROBIC IDENTIFY: CPT

## 2023-01-06 PROCEDURE — 6360000002 HC RX W HCPCS: Performed by: NURSE PRACTITIONER

## 2023-01-06 PROCEDURE — 84484 ASSAY OF TROPONIN QUANT: CPT

## 2023-01-06 PROCEDURE — 71045 X-RAY EXAM CHEST 1 VIEW: CPT

## 2023-01-06 PROCEDURE — 6360000002 HC RX W HCPCS: Performed by: STUDENT IN AN ORGANIZED HEALTH CARE EDUCATION/TRAINING PROGRAM

## 2023-01-06 RX ORDER — SODIUM CHLORIDE 9 MG/ML
INJECTION, SOLUTION INTRAVENOUS PRN
Status: DISCONTINUED | OUTPATIENT
Start: 2023-01-06 | End: 2023-01-09

## 2023-01-06 RX ORDER — SPIRONOLACTONE 25 MG/1
25 TABLET ORAL DAILY
Status: DISCONTINUED | OUTPATIENT
Start: 2023-01-07 | End: 2023-01-09 | Stop reason: HOSPADM

## 2023-01-06 RX ORDER — ACETAMINOPHEN 325 MG/1
650 TABLET ORAL EVERY 6 HOURS PRN
Status: DISCONTINUED | OUTPATIENT
Start: 2023-01-06 | End: 2023-01-09

## 2023-01-06 RX ORDER — SODIUM CHLORIDE 0.9 % (FLUSH) 0.9 %
5-40 SYRINGE (ML) INJECTION PRN
Status: DISCONTINUED | OUTPATIENT
Start: 2023-01-06 | End: 2023-01-09 | Stop reason: HOSPADM

## 2023-01-06 RX ORDER — ONDANSETRON 2 MG/ML
4 INJECTION INTRAMUSCULAR; INTRAVENOUS EVERY 6 HOURS PRN
Status: DISCONTINUED | OUTPATIENT
Start: 2023-01-06 | End: 2023-01-09 | Stop reason: HOSPADM

## 2023-01-06 RX ORDER — ESCITALOPRAM OXALATE 10 MG/1
20 TABLET ORAL DAILY
Status: DISCONTINUED | OUTPATIENT
Start: 2023-01-06 | End: 2023-01-09 | Stop reason: HOSPADM

## 2023-01-06 RX ORDER — ENOXAPARIN SODIUM 100 MG/ML
40 INJECTION SUBCUTANEOUS DAILY
Status: DISCONTINUED | OUTPATIENT
Start: 2023-01-06 | End: 2023-01-09 | Stop reason: HOSPADM

## 2023-01-06 RX ORDER — METHYLPREDNISOLONE SODIUM SUCCINATE 125 MG/2ML
125 INJECTION, POWDER, LYOPHILIZED, FOR SOLUTION INTRAMUSCULAR; INTRAVENOUS EVERY 6 HOURS
Status: DISCONTINUED | OUTPATIENT
Start: 2023-01-06 | End: 2023-01-07

## 2023-01-06 RX ORDER — DEXTROAMPHETAMINE SACCHARATE, AMPHETAMINE ASPARTATE, DEXTROAMPHETAMINE SULFATE AND AMPHETAMINE SULFATE 2.5; 2.5; 2.5; 2.5 MG/1; MG/1; MG/1; MG/1
10 TABLET ORAL
Status: DISCONTINUED | OUTPATIENT
Start: 2023-01-06 | End: 2023-01-09 | Stop reason: HOSPADM

## 2023-01-06 RX ORDER — ATORVASTATIN CALCIUM 20 MG/1
20 TABLET, FILM COATED ORAL NIGHTLY
Status: DISCONTINUED | OUTPATIENT
Start: 2023-01-06 | End: 2023-01-09 | Stop reason: HOSPADM

## 2023-01-06 RX ORDER — ONDANSETRON 2 MG/ML
4 INJECTION INTRAMUSCULAR; INTRAVENOUS ONCE
Status: COMPLETED | OUTPATIENT
Start: 2023-01-06 | End: 2023-01-06

## 2023-01-06 RX ORDER — TIZANIDINE 2 MG/1
2 TABLET ORAL NIGHTLY
Status: DISCONTINUED | OUTPATIENT
Start: 2023-01-06 | End: 2023-01-09 | Stop reason: HOSPADM

## 2023-01-06 RX ORDER — LORAZEPAM 2 MG/ML
1 INJECTION INTRAMUSCULAR ONCE
Status: COMPLETED | OUTPATIENT
Start: 2023-01-06 | End: 2023-01-07

## 2023-01-06 RX ORDER — MORPHINE SULFATE 4 MG/ML
4 INJECTION, SOLUTION INTRAMUSCULAR; INTRAVENOUS ONCE
Status: COMPLETED | OUTPATIENT
Start: 2023-01-06 | End: 2023-01-06

## 2023-01-06 RX ORDER — AMLODIPINE BESYLATE 10 MG/1
10 TABLET ORAL DAILY
Status: DISCONTINUED | OUTPATIENT
Start: 2023-01-07 | End: 2023-01-09 | Stop reason: HOSPADM

## 2023-01-06 RX ORDER — POLYETHYLENE GLYCOL 3350 17 G/17G
17 POWDER, FOR SOLUTION ORAL DAILY PRN
Status: DISCONTINUED | OUTPATIENT
Start: 2023-01-06 | End: 2023-01-09 | Stop reason: HOSPADM

## 2023-01-06 RX ORDER — SODIUM CHLORIDE 0.9 % (FLUSH) 0.9 %
5-40 SYRINGE (ML) INJECTION EVERY 12 HOURS SCHEDULED
Status: DISCONTINUED | OUTPATIENT
Start: 2023-01-06 | End: 2023-01-09 | Stop reason: HOSPADM

## 2023-01-06 RX ORDER — ONDANSETRON 4 MG/1
4 TABLET, ORALLY DISINTEGRATING ORAL EVERY 8 HOURS PRN
Status: DISCONTINUED | OUTPATIENT
Start: 2023-01-06 | End: 2023-01-09 | Stop reason: HOSPADM

## 2023-01-06 RX ORDER — ACETAMINOPHEN 650 MG/1
650 SUPPOSITORY RECTAL EVERY 6 HOURS PRN
Status: DISCONTINUED | OUTPATIENT
Start: 2023-01-06 | End: 2023-01-09

## 2023-01-06 RX ADMIN — MORPHINE SULFATE 4 MG: 4 INJECTION INTRAVENOUS at 14:43

## 2023-01-06 RX ADMIN — METHYLPREDNISOLONE SODIUM SUCCINATE 125 MG: 125 INJECTION, POWDER, FOR SOLUTION INTRAMUSCULAR; INTRAVENOUS at 21:19

## 2023-01-06 RX ADMIN — SODIUM CHLORIDE, PRESERVATIVE FREE 10 ML: 5 INJECTION INTRAVENOUS at 22:21

## 2023-01-06 RX ADMIN — ONDANSETRON 4 MG: 2 INJECTION INTRAMUSCULAR; INTRAVENOUS at 14:43

## 2023-01-06 RX ADMIN — TIZANIDINE 2 MG: 2 TABLET ORAL at 20:26

## 2023-01-06 ASSESSMENT — PAIN SCALES - GENERAL
PAINLEVEL_OUTOF10: 7
PAINLEVEL_OUTOF10: 7
PAINLEVEL_OUTOF10: 5

## 2023-01-06 ASSESSMENT — PAIN - FUNCTIONAL ASSESSMENT
PAIN_FUNCTIONAL_ASSESSMENT: 0-10
PAIN_FUNCTIONAL_ASSESSMENT: PREVENTS OR INTERFERES SOME ACTIVE ACTIVITIES AND ADLS

## 2023-01-06 ASSESSMENT — PAIN DESCRIPTION - LOCATION
LOCATION: NECK;HEAD;ARM
LOCATION: NECK

## 2023-01-06 ASSESSMENT — PAIN DESCRIPTION - ORIENTATION
ORIENTATION: POSTERIOR
ORIENTATION: RIGHT

## 2023-01-06 ASSESSMENT — ENCOUNTER SYMPTOMS
COUGH: 0
SHORTNESS OF BREATH: 1
ABDOMINAL PAIN: 0

## 2023-01-06 ASSESSMENT — PAIN DESCRIPTION - FREQUENCY: FREQUENCY: CONTINUOUS

## 2023-01-06 ASSESSMENT — PAIN DESCRIPTION - DIRECTION: RADIATING_TOWARDS: TOP OF HEAD

## 2023-01-06 ASSESSMENT — PAIN DESCRIPTION - ONSET: ONSET: ON-GOING

## 2023-01-06 ASSESSMENT — PAIN DESCRIPTION - DESCRIPTORS
DESCRIPTORS: ACHING;SPASM;SORE
DESCRIPTORS: ACHING

## 2023-01-06 ASSESSMENT — PAIN DESCRIPTION - PAIN TYPE: TYPE: ACUTE PAIN

## 2023-01-06 NOTE — ED NOTES
Pt resting on cot, no acute distress noted, RR even and NL. Pt given warm blankets. Pt denies complaints at this time.       Ade Polo RN  01/06/23 7945

## 2023-01-06 NOTE — ED PROVIDER NOTES
[facility] ED     Emergency Department     Faculty Attestation        I performed a history and physical examination of the patient and discussed management with the resident. I reviewed the residents note and agree with the documented findings and plan of care. Any areas of disagreement are noted on the chart. I was personally present for the key portions of any procedures. I have documented in the chart those procedures where I was not present during the key portions. I have reviewed the emergency nurses triage note. I agree with the chief complaint, past medical history, past surgical history, allergies, medications, social and family history as documented unless otherwise noted below. For mid-level providers such as nurse practitioners as well as physicians assistants:    I have personally seen and evaluated the patient. I find the patient's history and physical exam are consistent with NP/PA documentation. I agree with the care provided, treatment rendered, disposition, & follow-up plan. Additional findings are as noted.     Vital Signs: /80   Pulse 95   Temp 98.9 °F (37.2 °C) (Oral)   Resp 16   Ht 5' 8\" (1.727 m)   Wt 200 lb (90.7 kg)   SpO2 97%   BMI 30.41 kg/m²   PCP:  Ramana Villatoro PA-C    Pertinent Comments:     Patient complaining of typical symptoms for MS exacerbation      Critical Care  None          Narda Savage MD    Attending Emergency Medicine Physician            Quincy Mora MD  01/06/23 6374

## 2023-01-06 NOTE — PROGRESS NOTES
Patient evaluated per oxygen therapy protocol. Patient is on room air at 96%. No oxygen indicated at this time.

## 2023-01-06 NOTE — ED NOTES
Neurology called RN to verify dosages, Neurology given what pt stated for daily home meds.       Chloe Landaverde RN  01/06/23 1255

## 2023-01-06 NOTE — ED PROVIDER NOTES
[facility]  ED  Emergency Department Encounter  Emergency Medicine Resident     Pt Name: Audra Wilkinson  MRN: [id number]  [place] 1970  Date of evaluation: 1/6/23  PCP:  Yu Wick PA-C    CHIEF COMPLAINT       Chief Complaint   Patient presents with    Multiple Sclerosis     Worst Exacerbation; Headache; Right side numbness; blurry vision;        HISTORY OFPRESENT ILLNESS  (Location/Symptom, Timing/Onset, Context/Setting, Quality, Duration, Modifying Factors,Severity.)      Audra Wilkinson is a 45 y. o.yo female who presents with concern for a multiple sclerosis flare. Patient states she has a history of MS, states she has tried multiple medication in the past however found nothing that works without significant side effects of currently does not take any maintenance medications. Patient states she gets a severe flare approximately once a year, when this happens she typically gets admitted for a few days of IV steroids and this seems to fix things. Patient states she is currently in between neurologist, unable to remember the name of her last neurologist, states she is seeing a new neurologist sometime in February. States her flare started right around Marty. States she been having blurred vision as well as worsening right upper extremity numbness. Patient states her symptoms been getting progressively worse, states she has been trying to wait out at home however is gotten the point where she can no longer wait out. Denies any chest pain. Has had some mild shortness of breath. Denies any fevers or chills. Denies any recent illnesses. PAST MEDICAL / SURGICAL / SOCIAL / FAMILY HISTORY      has a past medical history of Anxiety, Depression, Hypertension, Kidney stones, Multiple sclerosis ([address] and Osteoporosis. has a past surgical history that includes Hysterectomy (2005); Tonsillectomy and adenoidectomy (1978); Cholecystectomy (2000);  Appendectomy (2001); and Kidney stone surgery (2014). Social History     Socioeconomic History    Marital status: Unknown     Spouse name: Not on file    Number of children: Not on file    Years of education: Not on file    Highest education level: Not on file   Occupational History    Not on file   Tobacco Use    Smoking status: Passive Smoke Exposure - Never Smoker    Smokeless tobacco: Never   Substance and Sexual Activity    Alcohol use: No    Drug use: No    Sexual activity: Never   Other Topics Concern    Not on file   Social History Narrative    Not on file     Social Determinants of Health     Financial Resource Strain: Not on file   Food Insecurity: Not on file   Transportation Needs: Not on file   Physical Activity: Not on file   Stress: Not on file   Social Connections: Not on file   Intimate Partner Violence: Not on file   Housing Stability: Not on file       Family History   Problem Relation Age of Onset    Hypertension Mother     Diabetes Mother     Thyroid Disease Mother     Mult Sclerosis Father     Kidney Disease Maternal Grandmother         Allergies:  Bee venom, Tigan [trimethobenzamide hcl], Toprol xl [metoprolol], Toradol [ketorolac tromethamine], and Imitrex [sumatriptan]    Home Medications:  Prior to Admission medications    Medication Sig Start Date End Date Taking? Authorizing Provider   amLODIPine (NORVASC) 10 MG tablet 1 tablet    Historical Provider, MD   ondansetron (ZOFRAN ODT) 4 MG disintegrating tablet Take 1 tablet by mouth every 8 hours as needed for Nausea 11/12/22   Stefanie Glover MD   acetaminophen (TYLENOL) 500 MG tablet Take 1 tablet by mouth 4 times daily as needed for Pain 1/18/22 1/28/22  Adria Spikes, DO   lidocaine (LIDODERM) 5 % Place 1 patch onto the skin daily 12 hours on, 12 hours off.   Patient not taking: Reported on 1/6/2023 1/18/22   Adria Spikes, DO   Multiple Vitamins-Minerals (MULTIVITAMIN ADULT) CHEW Take 1 tablet by mouth daily  Patient not taking: Reported on 1/6/2023 1/18/22   Mao See DO   vitamin D (CHOLECALCIFEROL) 1000 UNIT TABS tablet Take 1,000 Units by mouth daily  Patient not taking: Reported on 1/6/2023    Historical Provider, MD   lisinopril (PRINIVIL;ZESTRIL) 20 MG tablet   Take 20 mg by mouth 2 times daily   Patient not taking: Reported on 1/6/2023 4/22/15   Historical Provider, MD   amphetamine-dextroamphetamine (ADDERALL) 10 MG tablet   Take 20 mg by mouth daily     Historical Provider, MD   escitalopram (LEXAPRO) 10 MG tablet Take 10 mg by mouth 2 times daily. Historical Provider, MD       REVIEW OFSYSTEMS    (2-9 systems for level 4, 10 or more for level 5)      Review of Systems   Constitutional:  Negative for chills and fever. HENT:  Negative for congestion. Eyes:  Positive for visual disturbance. Respiratory:  Positive for shortness of breath. Negative for cough. Cardiovascular:  Negative for chest pain. Gastrointestinal:  Negative for abdominal pain. Genitourinary:  Negative for dysuria. Musculoskeletal:  Negative for gait problem. Skin:  Negative for rash and wound. Neurological:  Positive for weakness, numbness and headaches. Psychiatric/Behavioral:  Negative for confusion. PHYSICAL EXAM   (up to 7 for level 4, 8 or more forlevel 5)      INITIAL VITALS:   Vitals:    01/06/23 1345 01/06/23 1415 01/06/23 1530 01/06/23 1800   BP: 106/73 120/68 129/83 108/71   Pulse: 78 68 71 72   Resp: 24 15 13 16   Temp:    97.5 °F (36.4 °C)   TempSrc:    Oral   SpO2: 94% 95% 94% 96%   Weight:    206 lb (93.4 kg)   Height:    5' 8\" (1.727 m)         Physical Exam  Vitals reviewed. Constitutional:       General: She is not in acute distress. Appearance: Normal appearance. HENT:      Head: Normocephalic and atraumatic. Right Ear: External ear normal.      Left Ear: External ear normal.      Nose: Nose normal.      Mouth/Throat:      Mouth: Mucous membranes are moist.   Eyes:      General:         Right eye: No discharge. Left eye: No discharge. Extraocular Movements: Extraocular movements intact. Pupils: Pupils are equal, round, and reactive to light. Cardiovascular:      Rate and Rhythm: Normal rate and regular rhythm. Pulses: Normal pulses. Pulmonary:      Effort: Pulmonary effort is normal. No respiratory distress. Abdominal:      General: There is no distension. Palpations: Abdomen is soft. Tenderness: There is no abdominal tenderness. Musculoskeletal:      Cervical back: Normal range of motion. Comments: Moving all 4 extremities   Skin:     General: Skin is warm. Capillary Refill: Capillary refill takes less than 2 seconds. Neurological:      Mental Status: She is alert and oriented to person, place, and time. Comments: Strength 5/5 in bilateral upper and lower extremities. Decreased sensation in right lower extremity and right upper extremity which patient states is baseline for her   Psychiatric:         Mood and Affect: Mood normal.       DIFFERENTIAL  DIAGNOSIS     PLAN (LABS / IMAGING / EKG):  Orders Placed This Encounter   Procedures    XR CHEST PORTABLE    MRI BRAIN W WO CONTRAST    MRI CERVICAL SPINE W WO CONTRAST    CBC with Auto Differential    BMP    Troponin    Basic Metabolic Panel w/ Reflex to MG    CBC with Auto Differential    Urinalysis with Reflex to Culture    ADULT DIET; Regular    Vital signs per unit routine    Notify physician    Notify physician    Patient may shower    Neuro checks    Swallow assessment by nursing before diet and oral medications started.     Up with assistance    Telemetry monitoring - 72 hour duration    Full Code    Inpatient consult to Neurology    OT eval and treat    PT evaluation and treat    Initiate Oxygen Therapy Protocol    EKG 12 Lead    ADMIT TO INPATIENT       MEDICATIONS ORDERED:  Orders Placed This Encounter   Medications    sodium chloride flush 0.9 % injection 5-40 mL    sodium chloride flush 0.9 % injection 5-40 mL 0.9 % sodium chloride infusion    enoxaparin (LOVENOX) injection 40 mg     Order Specific Question:   Indication of Use     Answer:   Prophylaxis-DVT/PE    OR Linked Order Group     ondansetron (ZOFRAN-ODT) disintegrating tablet 4 mg     ondansetron (ZOFRAN) injection 4 mg    polyethylene glycol (GLYCOLAX) packet 17 g    OR Linked Order Group     acetaminophen (TYLENOL) tablet 650 mg     acetaminophen (TYLENOL) suppository 650 mg    morphine injection 4 mg    ondansetron (ZOFRAN) injection 4 mg    amLODIPine (NORVASC) tablet 10 mg    amphetamine-dextroamphetamine (ADDERALL) tablet 10 mg    escitalopram (LEXAPRO) tablet 20 mg    spironolactone (ALDACTONE) tablet 25 mg    atorvastatin (LIPITOR) tablet 20 mg    methylPREDNISolone sodium (SOLU-MEDROL) injection 125 mg    LORazepam (ATIVAN) injection 1 mg    tiZANidine (ZANAFLEX) tablet 2 mg       DDX: Multiple sclerosis exacerbation, electrolyte abnormality, ACS, musculoskeletal pain    Initial MDM/Plan: 45 y.o. female who presents with concern for multiple sclerosis flare. Symptoms started around Marty. Has been progressively worsening, weakness and numbness noted in right upper extremity, visual disturbance. Currently in between neurologist, does not take any maintenance medicines for multiple sclerosis. Patient well-appearing initial evaluation, afebrile, stable vital signs. Does have decreased sensation to right upper extremity and right lower extremity which patient states is at baseline. Strength 5/5 in bilateral upper and lower extremities. Pupils equal round reactive to light. We will plan to obtain basic labs, EKG, chest x-ray. We will plan to discuss with neurology for further recommendations. Anticipate admission.     DIAGNOSTIC RESULTS / EMERGENCYDEPARTMENT COURSE / MDM     LABS:  Labs Reviewed   BASIC METABOLIC PANEL - Abnormal; Notable for the following components:       Result Value    Glucose 156 (*)     All other components within normal limits   CBC WITH AUTO DIFFERENTIAL   TROPONIN   URINALYSIS WITH REFLEX TO CULTURE   BASIC METABOLIC PANEL W/ REFLEX TO MG FOR LOW K   CBC WITH AUTO DIFFERENTIAL         RADIOLOGY:  XR CHEST PORTABLE    Result Date: 1/6/2023  EXAMINATION: ONE XRAY VIEW OF THE CHEST 1/6/2023 1:05 pm COMPARISON: 01/18/2022 HISTORY: ORDERING SYSTEM PROVIDED HISTORY: sob TECHNOLOGIST PROVIDED HISTORY: sob Reason for Exam: SOB port upr at 1pm FINDINGS: Cardiomediastinal silhouette appears within normal limits. No focal consolidation or overt pulmonary edema. Costophrenic angles are sharp. No evidence of pneumothorax. No acute osseous abnormalities. No radiographic evidence of an acute cardiopulmonary process. EMERGENCY DEPARTMENT COURSE:  ED Course as of 01/06/23 2224 Fri Jan 06, 2023   1321 Spoke with neurology. They will be down to evaluate patient. [AB]   1321 WBC: 6.8 [AB]   1321 Hemoglobin Quant: 14.7 [AB]   1345 Creatinine: 0.58 [AB]   1345 Troponin, High Sensitivity: <6 [AB]   1345 XR CHEST PORTABLE  IMPRESSION:  No radiographic evidence of an acute cardiopulmonary process. [AB]   1400 Discussed case with neurology. They will admit patient. [AB]      ED Course User Index  [AB] Sharyle Glaser, DO          PROCEDURES:  None    CONSULTS:  IP CONSULT TO NEUROLOGY    CRITICAL CARE:  See attending physician note    FINAL IMPRESSION      1. Multiple sclerosis exacerbation ([facility] [address]          DISPOSITION / PLAN     DISPOSITION Admitted 01/06/2023 02:05:37 PM      PATIENT REFERRED TO:  No follow-up provider specified.     DISCHARGE MEDICATIONS:  Current Discharge Medication List          Gavin Vargas DO  Emergency Medicine Resident    (Please note that portions of this note were completed with a voice recognition program.Efforts were made to edit the dictations but occasionally words are mis-transcribed.)       Sharyle Glaser, DO  Resident  01/06/23 2224

## 2023-01-06 NOTE — H&P
[facility] Neurology   [address] [place] [address]    HISTORY AND PHYSICAL EXAMINATION            Date:   1/6/2023  Patient name:  Jamari Bashir  Date of admission:  1/6/2023 12:39 PM  MRN:   [id number]  Account:  [de-identified]  YOB: 1970  PCP:    Quinton Chan PA-C  Room:   01/01  Code Status:    No Order    Chief Complaint:     Chief Complaint   Patient presents with    Multiple Sclerosis     Worst Exacerbation; Headache; Right side numbness; blurry vision; History Obtained From:     patient, electronic medical record    History of Present Illness: The patient is a 45 y.o. Non- / non  female who presents to the hospital for the management of MS flare. The patient reports a history of relapsing remitting possible sclerosis with the onset of 2001. She has seen neurologists in the past but is currently in between providers as she had a recent insurance change. In the past she has tried Avonex, Rebif, Copaxone but was not able to tolerate any of these due to side effects. She is not currently on any disease modifying therapies. She presents today with complaints of an MS flare that started right around [place] time. She has right-sided numbness to her arm and leg at baseline but reports this is worse than normal, as well as a feeling of neck pressure/pain starting in the back of her neck and radiating into her right arm. She also reports a headache and blurred vision with generalized weakness. She has had increased stress at home lately due to her dad's diagnosis of cancer. She did move in with him to help care for him. She does not use a cane or a walker at home but does admit to frequent falls. No recent imaging available for review in her chart. She reports typically getting an MS flare approximately every 6 months. She is typically treated with high-dose steroids with improvement in the symptoms.  She denies any dysphagia or trouble breathing. Past Medical History:     Past Medical History:   Diagnosis Date    Anxiety     Depression     Hypertension     Kidney stones     Multiple sclerosis ([facility] [address] 2001    Osteoporosis 2015        Past Surgical History:     Past Surgical History:   Procedure Laterality Date    APPENDECTOMY  2001    CHOLECYSTECTOMY  2000    HYSTERECTOMY (CERVIX STATUS UNKNOWN)  2005    complete with ovary removal    KIDNEY STONE SURGERY  2014    TONSILLECTOMY AND ADENOIDECTOMY  1978        Medications Prior to Admission:     Prior to Admission medications    Medication Sig Start Date End Date Taking? Authorizing Provider   amLODIPine (NORVASC) 10 MG tablet 1 tablet    Historical Provider, MD   ondansetron (ZOFRAN ODT) 4 MG disintegrating tablet Take 1 tablet by mouth every 8 hours as needed for Nausea 11/12/22   Olivia Pickett MD   acetaminophen (TYLENOL) 500 MG tablet Take 1 tablet by mouth 4 times daily as needed for Pain 1/18/22 1/28/22  Luís Wayne DO   lidocaine (LIDODERM) 5 % Place 1 patch onto the skin daily 12 hours on, 12 hours off. 1/18/22   Luís Wayne, DO   Multiple Vitamins-Minerals (MULTIVITAMIN ADULT) CHEW Take 1 tablet by mouth daily 1/18/22   Luís Wayne DO   vitamin D (CHOLECALCIFEROL) 1000 UNIT TABS tablet Take 1,000 Units by mouth daily    Historical Provider, MD   lisinopril (PRINIVIL;ZESTRIL) 20 MG tablet   Take 20 mg by mouth 2 times daily  4/22/15   Historical Provider, MD   amphetamine-dextroamphetamine (ADDERALL, 10MG,) 10 MG tablet   Take 20 mg by mouth daily     Historical Provider, MD   escitalopram (LEXAPRO) 10 MG tablet Take 10 mg by mouth 2 times daily. Historical Provider, MD        Allergies:     Bee venom, Tigan [trimethobenzamide hcl], Toprol xl [metoprolol], Toradol [ketorolac tromethamine], and Imitrex [sumatriptan]    Social History:     Tobacco:    reports that she is a non-smoker but has been exposed to tobacco smoke.  She has never used smokeless tobacco.  Alcohol:      reports no history of alcohol use. Drug Use:  reports no history of drug use. Family History:     Family History   Problem Relation Age of Onset    Hypertension Mother     Diabetes Mother     Thyroid Disease Mother     Mult Sclerosis Father     Kidney Disease Maternal Grandmother        Review of Systems:     ROS:  Constitutional  Negative for fever and chills    HEENT  Negative for ear discharge, ear pain, nosebleed. + headache   Eyes  Negative for photophobia, pain and discharge. + blurred vision   Respiratory  Negative for hemoptysis and sputum    Cardiovascular  Negative for orthopnea, claudication and PND    Gastrointestinal  Negative for abdominal pain, diarrhea, blood in stool    Musculoskeletal  Negative for joint pain, negative for myalgia. + generalized weakness   Skin  Negative for rash or itching    Endo/heme/allergies  Negative for polydipsia, environmental allergy    Psychiatric/behavioral  Negative for suicidal ideation. Patient is not anxious        Physical Exam:   /80   Pulse 95   Temp 98.9 °F (37.2 °C) (Oral)   Resp 16   Ht 5' 8\" (1.727 m)   Wt 200 lb (90.7 kg)   SpO2 97%   BMI 30.41 kg/m²   Temp (24hrs), Av.9 °F (37.2 °C), Min:98.9 °F (37.2 °C), Max:98.9 °F (37.2 °C)    No results for input(s): POCGLU in the last 72 hours. No intake or output data in the 24 hours ending 23 1346    General Appearance:  alert, well appearing, and in no acute distress  Mental status: oriented to person, place, and time with normal affect  Head:  normocephalic, atraumatic.   Eye: no icterus, redness, pupils equal and reactive, extraocular eye movements intact, conjunctiva clear  Ear: normal external ear, no discharge, hearing intact  Nose:  no drainage noted  Mouth: mucous membranes moist  Neck: supple, no carotid bruits, thyroid not palpable  Lungs: Bilateral equal air entry, clear to ausculation, no wheezing, rales or rhonchi, normal effort  Cardiovascular: normal rate, regular rhythm, no murmur, gallop, rub.   Abdomen: Soft, nontender, nondistended, normal bowel sounds, no hepatomegaly or splenomegaly  Neurologic: There are no new focal motor or sensory deficits, normal muscle tone and bulk, right arm and leg weakness, normal speech, cranial nerves II through XII grossly intact  Skin: No gross lesions, rashes, bruising or bleeding on exposed skin area  Extremities:  peripheral pulses palpable, no pedal edema or calf pain with palpation  Psych: normal affect      NEUROLOGIC EXAMINATION  GENERAL  Appears comfortable and in no distress   HEENT  NC/ AT   NECK  Supple   MENTAL STATUS:  Alert, oriented, intact memory, no confusion, normal speech, normal language, no hallucination or delusion   CRANIAL NERVES: II     -      Visual fields intact to confrontation  III,IV,VI -  EOMs full, no afferent defect, no JAEL, no ptosis  V     -     Normal facial sensation  VII    -     Normal facial symmetry  VIII   -     Intact hearing  IX,X -     Symmetrical palate  XI    -     Symmetrical shoulder shrug  XII   -     Midline tongue, no atrophy    MOTOR FUNCTION:  Full strength to BUE and LLE, 4+/5 to RLE with normal bulk, normal tone and no involuntary movements, no tremor   SENSORY FUNCTION:  Decreased sensation to right arm and leg   CEREBELLAR FUNCTION:  Intact fine motor control over upper limbs   REFLEX FUNCTION:  Symmetric, no perverted reflex, no Babinski sign   STATION and GAIT  Not tested       Investigations:      Laboratory Testing:  Recent Results (from the past 24 hour(s))   CBC with Auto Differential    Collection Time: 01/06/23 12:56 PM   Result Value Ref Range    WBC 6.8 3.5 - 11.3 k/uL    RBC 4.83 3.95 - 5.11 m/uL    Hemoglobin 14.7 11.9 - 15.1 g/dL    Hematocrit 43.5 36.3 - 47.1 %    MCV 90.1 82.6 - 102.9 fL    MCH 30.4 25.2 - 33.5 pg    MCHC 33.8 28.4 - 34.8 g/dL    RDW 11.9 11.8 - 14.4 %    Platelets 074 961 - 120 k/uL    MPV 10.8 8.1 - 13.5 fL    NRBC Automated 0.0 0.0 per 100 WBC    Seg Neutrophils 54 36 - 65 %    Lymphocytes 35 24 - 43 %    Monocytes 8 3 - 12 %    Eosinophils % 2 1 - 4 %    Basophils 1 0 - 2 %    Immature Granulocytes 0 0 %    Segs Absolute 3.68 1.50 - 8.10 k/uL    Absolute Lymph # 2.38 1.10 - 3.70 k/uL    Absolute Mono # 0.55 0.10 - 1.20 k/uL    Absolute Eos # 0.11 0.00 - 0.44 k/uL    Basophils Absolute 0.09 0.00 - 0.20 k/uL    Absolute Immature Granulocyte <0.03 0.00 - 0.30 k/uL   BMP    Collection Time: 01/06/23 12:56 PM   Result Value Ref Range    Glucose 156 (H) 70 - 99 mg/dL    BUN 12 6 - 20 mg/dL    Creatinine 0.58 0.50 - 0.90 mg/dL    Est, Glom Filt Rate >60 >60 mL/min/1.73m2    Calcium 9.7 8.6 - 10.4 mg/dL    Sodium 136 135 - 144 mmol/L    Potassium 3.9 3.7 - 5.3 mmol/L    Chloride 101 98 - 107 mmol/L    CO2 23 20 - 31 mmol/L    Anion Gap 12 9 - 17 mmol/L   Troponin    Collection Time: 01/06/23 12:56 PM   Result Value Ref Range    Troponin, High Sensitivity <6 0 - 14 ng/L           Assessment :      Primary Problem  -Reported history of RRMS with worsening neck pain, blurred vision, right sided numbness, and generalized weakness for the past 2 weeks; concern for MS flare  -Headache    Plan:     Patient status Admit as inpatient in the  Progressive Unit/Step down    Will get MRI brain and MRI cervical spine with and without contrast  Will get UA, chest XRay  Will start IV Solumedrol 125mg [id number] and adjust dosing based on MRI results  PT/OT evals  Resume home medications  DVT prophylaxis; will start Lovenox    Consultations:   IP CONSULT TO NEUROLOGY    Patient is admitted as inpatient status because of co-morbidities listed above, severity of signs and symptoms as outlined, requirement for current medical therapies and most importantly because of direct risk to patient if care not provided in a hospital setting.     Saima Tamayo, MALCOLM - CNP  1/6/2023  1:46 PM    Copy sent to Dr. Amanda Gant PA-C

## 2023-01-06 NOTE — ED NOTES
Pt to ER for c/o MS flare up. Pt states that this is a worsening exacerbation. Pt states that she has noticed SOB and weakness. Pt states that the majority of her weakness is on the R side and states that her right side is numb to the touch. Pt states that she has a PCP that she sees and a neurologist to manager her MS but she does not take any daily medications for the MS. Pt also reports being in between neurologists at this time so she has not been seen for the recent changes with the MS. Pt also complaining of a headache at this time. Pt placed on full monitor, no acute distress noted, RR even and NL. Dr. Nghia Patrick at bedside.        Jenaro Rosa RN  01/06/23 5172

## 2023-01-06 NOTE — PROGRESS NOTES
Patient evaluated per oxygen therapy protocol. Patient on room air at 96%. No oxygen indicated at this time.

## 2023-01-07 ENCOUNTER — APPOINTMENT (OUTPATIENT)
Dept: MRI IMAGING | Age: 53
DRG: 690 | End: 2023-01-07
Payer: COMMERCIAL

## 2023-01-07 PROBLEM — G44.201 ACUTE INTRACTABLE TENSION-TYPE HEADACHE: Status: ACTIVE | Noted: 2023-01-07

## 2023-01-07 PROBLEM — G35 MULTIPLE SCLEROSIS EXACERBATION (HCC): Status: ACTIVE | Noted: 2023-01-07

## 2023-01-07 LAB
ABSOLUTE EOS #: <0.03 K/UL (ref 0–0.44)
ABSOLUTE IMMATURE GRANULOCYTE: 0.04 K/UL (ref 0–0.3)
ABSOLUTE LYMPH #: 1.06 K/UL (ref 1.1–3.7)
ABSOLUTE MONO #: 0.04 K/UL (ref 0.1–1.2)
ANION GAP SERPL CALCULATED.3IONS-SCNC: 16 MMOL/L (ref 9–17)
BACTERIA: ABNORMAL
BASOPHILS # BLD: 1 % (ref 0–2)
BASOPHILS ABSOLUTE: 0.03 K/UL (ref 0–0.2)
BILIRUBIN URINE: NEGATIVE
BUN BLDV-MCNC: 20 MG/DL (ref 6–20)
CALCIUM SERPL-MCNC: 9.3 MG/DL (ref 8.6–10.4)
CASTS UA: ABNORMAL /LPF (ref 0–8)
CHLORIDE BLD-SCNC: 98 MMOL/L (ref 98–107)
CO2: 17 MMOL/L (ref 20–31)
COLOR: YELLOW
CREAT SERPL-MCNC: 0.95 MG/DL (ref 0.5–0.9)
EOSINOPHILS RELATIVE PERCENT: 0 % (ref 1–4)
EPITHELIAL CELLS UA: ABNORMAL /HPF (ref 0–5)
GFR SERPL CREATININE-BSD FRML MDRD: >60 ML/MIN/1.73M2
GLUCOSE BLD-MCNC: 274 MG/DL (ref 65–105)
GLUCOSE BLD-MCNC: 345 MG/DL (ref 65–105)
GLUCOSE BLD-MCNC: 350 MG/DL (ref 65–105)
GLUCOSE BLD-MCNC: 360 MG/DL (ref 65–105)
GLUCOSE BLD-MCNC: 466 MG/DL (ref 70–99)
GLUCOSE URINE: NEGATIVE
HCT VFR BLD CALC: 39.5 % (ref 36.3–47.1)
HEMOGLOBIN: 12.8 G/DL (ref 11.9–15.1)
IMMATURE GRANULOCYTES: 1 %
KETONES, URINE: NEGATIVE
LEUKOCYTE ESTERASE, URINE: ABNORMAL
LYMPHOCYTES # BLD: 21 % (ref 24–43)
MCH RBC QN AUTO: 30 PG (ref 25.2–33.5)
MCHC RBC AUTO-ENTMCNC: 32.4 G/DL (ref 28.4–34.8)
MCV RBC AUTO: 92.7 FL (ref 82.6–102.9)
MONOCYTES # BLD: 1 % (ref 3–12)
NITRITE, URINE: NEGATIVE
NRBC AUTOMATED: 0 PER 100 WBC
PDW BLD-RTO: 11.8 % (ref 11.8–14.4)
PH UA: 5 (ref 5–8)
PLATELET # BLD: 206 K/UL (ref 138–453)
PMV BLD AUTO: 11 FL (ref 8.1–13.5)
POTASSIUM SERPL-SCNC: 4.5 MMOL/L (ref 3.7–5.3)
PROTEIN UA: NEGATIVE
RBC # BLD: 4.26 M/UL (ref 3.95–5.11)
RBC UA: ABNORMAL /HPF (ref 0–4)
SEG NEUTROPHILS: 77 % (ref 36–65)
SEGMENTED NEUTROPHILS ABSOLUTE COUNT: 3.97 K/UL (ref 1.5–8.1)
SODIUM BLD-SCNC: 131 MMOL/L (ref 135–144)
SPECIFIC GRAVITY UA: 1.02 (ref 1–1.03)
TURBIDITY: ABNORMAL
URINE HGB: NEGATIVE
UROBILINOGEN, URINE: NORMAL
WBC # BLD: 5.1 K/UL (ref 3.5–11.3)
WBC UA: ABNORMAL /HPF (ref 0–5)

## 2023-01-07 PROCEDURE — 81001 URINALYSIS AUTO W/SCOPE: CPT

## 2023-01-07 PROCEDURE — 6370000000 HC RX 637 (ALT 250 FOR IP): Performed by: NURSE PRACTITIONER

## 2023-01-07 PROCEDURE — APPSS30 APP SPLIT SHARED TIME 16-30 MINUTES: Performed by: NURSE PRACTITIONER

## 2023-01-07 PROCEDURE — 2580000003 HC RX 258: Performed by: NURSE PRACTITIONER

## 2023-01-07 PROCEDURE — 82947 ASSAY GLUCOSE BLOOD QUANT: CPT

## 2023-01-07 PROCEDURE — 80048 BASIC METABOLIC PNL TOTAL CA: CPT

## 2023-01-07 PROCEDURE — 83036 HEMOGLOBIN GLYCOSYLATED A1C: CPT

## 2023-01-07 PROCEDURE — 6370000000 HC RX 637 (ALT 250 FOR IP)

## 2023-01-07 PROCEDURE — 97165 OT EVAL LOW COMPLEX 30 MIN: CPT

## 2023-01-07 PROCEDURE — 72156 MRI NECK SPINE W/O & W/DYE: CPT

## 2023-01-07 PROCEDURE — 85025 COMPLETE CBC W/AUTO DIFF WBC: CPT

## 2023-01-07 PROCEDURE — 1200000000 HC SEMI PRIVATE

## 2023-01-07 PROCEDURE — 70553 MRI BRAIN STEM W/O & W/DYE: CPT

## 2023-01-07 PROCEDURE — A9576 INJ PROHANCE MULTIPACK: HCPCS | Performed by: NURSE PRACTITIONER

## 2023-01-07 PROCEDURE — 2580000003 HC RX 258

## 2023-01-07 PROCEDURE — 99232 SBSQ HOSP IP/OBS MODERATE 35: CPT | Performed by: PSYCHIATRY & NEUROLOGY

## 2023-01-07 PROCEDURE — 6370000000 HC RX 637 (ALT 250 FOR IP): Performed by: PSYCHIATRY & NEUROLOGY

## 2023-01-07 PROCEDURE — 36415 COLL VENOUS BLD VENIPUNCTURE: CPT

## 2023-01-07 PROCEDURE — 6360000002 HC RX W HCPCS: Performed by: NURSE PRACTITIONER

## 2023-01-07 PROCEDURE — 6360000004 HC RX CONTRAST MEDICATION: Performed by: NURSE PRACTITIONER

## 2023-01-07 PROCEDURE — 97535 SELF CARE MNGMENT TRAINING: CPT

## 2023-01-07 PROCEDURE — 6360000002 HC RX W HCPCS

## 2023-01-07 RX ORDER — INSULIN LISPRO 100 [IU]/ML
0-4 INJECTION, SOLUTION INTRAVENOUS; SUBCUTANEOUS NIGHTLY
Status: DISCONTINUED | OUTPATIENT
Start: 2023-01-07 | End: 2023-01-08

## 2023-01-07 RX ORDER — INSULIN LISPRO 100 [IU]/ML
0-4 INJECTION, SOLUTION INTRAVENOUS; SUBCUTANEOUS
Status: DISCONTINUED | OUTPATIENT
Start: 2023-01-07 | End: 2023-01-08

## 2023-01-07 RX ORDER — HYDROCODONE BITARTRATE AND ACETAMINOPHEN 5; 325 MG/1; MG/1
1 TABLET ORAL ONCE
Status: COMPLETED | OUTPATIENT
Start: 2023-01-07 | End: 2023-01-07

## 2023-01-07 RX ORDER — PANTOPRAZOLE SODIUM 40 MG/1
40 TABLET, DELAYED RELEASE ORAL
Status: DISCONTINUED | OUTPATIENT
Start: 2023-01-08 | End: 2023-01-09 | Stop reason: HOSPADM

## 2023-01-07 RX ORDER — DEXTROSE MONOHYDRATE 100 MG/ML
INJECTION, SOLUTION INTRAVENOUS CONTINUOUS PRN
Status: DISCONTINUED | OUTPATIENT
Start: 2023-01-07 | End: 2023-01-09 | Stop reason: HOSPADM

## 2023-01-07 RX ORDER — SODIUM CHLORIDE 0.9 % (FLUSH) 0.9 %
10 SYRINGE (ML) INJECTION PRN
Status: DISCONTINUED | OUTPATIENT
Start: 2023-01-07 | End: 2023-01-09 | Stop reason: HOSPADM

## 2023-01-07 RX ORDER — AMITRIPTYLINE HYDROCHLORIDE 25 MG/1
12.5 TABLET, FILM COATED ORAL NIGHTLY
Status: DISCONTINUED | OUTPATIENT
Start: 2023-01-07 | End: 2023-01-09 | Stop reason: HOSPADM

## 2023-01-07 RX ADMIN — CEFTRIAXONE SODIUM 1000 MG: 10 INJECTION, POWDER, FOR SOLUTION INTRAVENOUS at 02:02

## 2023-01-07 RX ADMIN — SODIUM CHLORIDE, PRESERVATIVE FREE 10 ML: 5 INJECTION INTRAVENOUS at 08:42

## 2023-01-07 RX ADMIN — TIZANIDINE 2 MG: 2 TABLET ORAL at 20:51

## 2023-01-07 RX ADMIN — INSULIN LISPRO 2 UNITS: 100 INJECTION, SOLUTION INTRAVENOUS; SUBCUTANEOUS at 09:59

## 2023-01-07 RX ADMIN — SODIUM CHLORIDE, PRESERVATIVE FREE 10 ML: 5 INJECTION INTRAVENOUS at 08:01

## 2023-01-07 RX ADMIN — METHYLPREDNISOLONE SODIUM SUCCINATE 125 MG: 125 INJECTION, POWDER, FOR SOLUTION INTRAMUSCULAR; INTRAVENOUS at 06:17

## 2023-01-07 RX ADMIN — INSULIN LISPRO 3 UNITS: 100 INJECTION, SOLUTION INTRAVENOUS; SUBCUTANEOUS at 18:13

## 2023-01-07 RX ADMIN — SODIUM CHLORIDE, PRESERVATIVE FREE 10 ML: 5 INJECTION INTRAVENOUS at 20:51

## 2023-01-07 RX ADMIN — METHYLPREDNISOLONE SODIUM SUCCINATE 125 MG: 125 INJECTION, POWDER, FOR SOLUTION INTRAMUSCULAR; INTRAVENOUS at 12:28

## 2023-01-07 RX ADMIN — AMITRIPTYLINE HYDROCHLORIDE 12.5 MG: 25 TABLET, FILM COATED ORAL at 20:50

## 2023-01-07 RX ADMIN — ATORVASTATIN CALCIUM 20 MG: 20 TABLET, FILM COATED ORAL at 20:51

## 2023-01-07 RX ADMIN — GADOTERIDOL 18 ML: 279.3 INJECTION, SOLUTION INTRAVENOUS at 08:42

## 2023-01-07 RX ADMIN — ONDANSETRON 4 MG: 2 INJECTION INTRAMUSCULAR; INTRAVENOUS at 05:05

## 2023-01-07 RX ADMIN — HYDROCODONE BITARTRATE AND ACETAMINOPHEN 1 TABLET: 5; 325 TABLET ORAL at 05:05

## 2023-01-07 RX ADMIN — ACETAMINOPHEN 650 MG: 325 TABLET ORAL at 20:49

## 2023-01-07 RX ADMIN — INSULIN LISPRO 4 UNITS: 100 INJECTION, SOLUTION INTRAVENOUS; SUBCUTANEOUS at 12:28

## 2023-01-07 RX ADMIN — INSULIN LISPRO 4 UNITS: 100 INJECTION, SOLUTION INTRAVENOUS; SUBCUTANEOUS at 21:23

## 2023-01-07 RX ADMIN — ONDANSETRON 4 MG: 4 TABLET, ORALLY DISINTEGRATING ORAL at 00:20

## 2023-01-07 RX ADMIN — LORAZEPAM 1 MG: 2 INJECTION INTRAMUSCULAR; INTRAVENOUS at 07:55

## 2023-01-07 RX ADMIN — ENOXAPARIN SODIUM 40 MG: 100 INJECTION SUBCUTANEOUS at 09:59

## 2023-01-07 RX ADMIN — METHYLPREDNISOLONE SODIUM SUCCINATE 125 MG: 125 INJECTION, POWDER, FOR SOLUTION INTRAMUSCULAR; INTRAVENOUS at 00:17

## 2023-01-07 ASSESSMENT — PAIN - FUNCTIONAL ASSESSMENT: PAIN_FUNCTIONAL_ASSESSMENT: ACTIVITIES ARE NOT PREVENTED

## 2023-01-07 ASSESSMENT — PAIN DESCRIPTION - DIRECTION: RADIATING_TOWARDS: TOP OF HEAD

## 2023-01-07 ASSESSMENT — PAIN DESCRIPTION - PAIN TYPE: TYPE: ACUTE PAIN

## 2023-01-07 ASSESSMENT — PAIN DESCRIPTION - LOCATION
LOCATION: NECK
LOCATION: NECK

## 2023-01-07 ASSESSMENT — PAIN DESCRIPTION - ONSET: ONSET: ON-GOING

## 2023-01-07 ASSESSMENT — PAIN DESCRIPTION - FREQUENCY: FREQUENCY: CONTINUOUS

## 2023-01-07 ASSESSMENT — PAIN DESCRIPTION - DESCRIPTORS
DESCRIPTORS: ACHING
DESCRIPTORS: THROBBING

## 2023-01-07 ASSESSMENT — PAIN DESCRIPTION - ORIENTATION: ORIENTATION: POSTERIOR

## 2023-01-07 ASSESSMENT — PAIN SCALES - GENERAL
PAINLEVEL_OUTOF10: 6
PAINLEVEL_OUTOF10: 7
PAINLEVEL_OUTOF10: 3
PAINLEVEL_OUTOF10: 7

## 2023-01-07 NOTE — PROGRESS NOTES
Occupational Therapy  Facility/Department: [facility] 3C OBSERVATION  Occupational Therapy Initial Assessment    Name: Ana Pate  : 1970  MRN: [id number]  Date of Service: 2023    Discharge Recommendations:   No occupational therapy recommended at discharge      Patient Diagnosis(es): The encounter diagnosis was Multiple sclerosis exacerbation ([facility] [address] Past Medical History:  has a past medical history of Anxiety, Depression, Hypertension, Kidney stones, Multiple sclerosis ([address] and Osteoporosis. Past Surgical History:  has a past surgical history that includes Hysterectomy (); Tonsillectomy and adenoidectomy (); Cholecystectomy (); Appendectomy (); and Kidney stone surgery (). Assessment   Assessment: pt demonstrated no difficulties no completing ADLS and functional mobility safely. pt with no acute OT needs at this time, please re-order if future needs arise  Prognosis: Good  Decision Making: Low Complexity  No Skilled OT: Independent with ADL's;Independent with functional mobility; No OT goals identified  REQUIRES OT FOLLOW-UP: No  Activity Tolerance  Activity Tolerance: Patient Tolerated treatment well        Plan   Occupational Therapy Plan  Times Per Week: D/C OT     Restrictions  Restrictions/Precautions  Required Braces or Orthoses?: No  Position Activity Restriction  Other position/activity restrictions: hx of MS, up with assist    Subjective   General  Patient assessed for rehabilitation services?: Yes  Family / Caregiver Present: No  Diagnosis: MS  General Comment  Comments: RN ok'd for therapy this afternoon. pt agreeable to participate in session and cooperative/pleasant throughout. pt shared history and medical problems, writer provided emotional support and active listening.  pt reported 4/10 neck and head pain     Social/Functional History  Social/Functional History  Lives With: Parent, Son  Type of Home: House  Home Layout: One level  Home Access: Stairs to enter with rails  Entrance Stairs - Number of Steps: 2  Entrance Stairs - Rails: Both  Bathroom Shower/Tub: Tub/Shower unit  Bathroom Toilet: Standard  Bathroom Equipment: Grab bars in shower, Grab bars around toilet  ADL Assistance: [address]: Independent  Homemaking Responsibilities: Yes  Ambulation Assistance: Independent  Transfer Assistance: Independent  Active : Yes  Mode of Transportation: Car  Occupation: On disability  Additional Comments: 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 pt completed functional mobility to/from hallway and to/from bathroom with no difficulty)  Transfer Training  Transfer Training: Yes  Overall Level of Assistance: Independent  Sit to Stand: Independent  Stand to Sit: Independent  Gait  Overall Level of Assistance: Independent       AROM: Within functional limits  Strength:  Within functional limits  Coordination: Within functional limits  Tone: Normal  Sensation: Intact    ADL  Feeding: Independent  Grooming: Independent  UE Bathing: Independent  LE Bathing: Independent  UE Dressing: Independent  LE Dressing: Independent  Toileting: Independent                Vision  Vision: Impaired  Vision Exceptions: Wears glasses for reading  Hearing  Hearing: Within functional limits    Cognition  Overall Cognitive Status: WFL  Orientation  Overall Orientation Status: Within Functional Limits                    Education Given To: Patient  Education Provided: Role of Therapy;Plan of Care  Education Method: Verbal  Barriers to Learning: None  Education Outcome: Verbalized understanding    LUE AROM (degrees)  LUE AROM : WFL  Left Hand AROM (degrees)  Left Hand AROM: WFL  RUE AROM (degrees)  RUE AROM : [facility]  Right Hand AROM (degrees)  Right Hand AROM: [facility]                                                           AM-PAC Score        AM-PAC Inpatient Daily Activity Raw Score: 24 (01/07/23 1509)  AM-PAC Inpatient ADL T-Scale Score : 57.54 (01/07/23 1509)  ADL Inpatient CMS 0-100% Score: 0 (01/07/23 1509)  ADL Inpatient CMS G-Code Modifier : [facility] (01/07/23 1509)          Therapy Time   Individual Concurrent Group Co-treatment   Time In 8572         Time Out 1500         Minutes 40         Timed Code Treatment Minutes: [address], OTR/L

## 2023-01-07 NOTE — CARE COORDINATION
01/07/23 1145   Service Assessment   Patient Orientation Alert and Oriented   Cognition Alert   History Provided By Patient   Primary Caregiver Bhargav 18 Family Members; Children;Parent   PCP Verified by CM Yes   Last Visit to PCP Within last 3 months   Prior Functional Level Independent in ADLs/IADLs   Current Functional Level Independent in ADLs/IADLs   Can patient return to prior living arrangement Yes   Ability to make needs known: Good   Family able to assist with home care needs: Yes   Financial Resources Medicaid; Medicare   Social/Functional History   Lives With Parent; Son   Type of [address] One level   [address] to enter with rails   Entrance Stairs - Number of Steps 2   Entrance Stairs - Rails Both   Bathroom Shower/Tub Tub/Shower unit   Bathroom Toilet Standard   Bathroom Equipment Grab bars in shower;Grab bars around toilet   ADL Assistance Independent   Homemaking Assistance Independent   Homemaking Responsibilities Yes   Ambulation Assistance Independent   Transfer Assistance Independent   Active  Yes   Mode of Transportation Car   Occupation On disability   Discharge Planning   Type of [facility] Family Members   Current Services Prior To Admission None   [address]   DME Ordered?  No   Potential Assistance Purchasing Medications No   Type of Home Care Services None   Patient expects to be discharged to: House   One/Two Story Residence One story   Services At/After Discharge   Transition of Care Consult (CM Consult) Discharge Planning   Mode of Transport at Discharge Self   Confirm Follow Up Transport Self   Condition of Participation: Discharge Planning   The Plan for Transition of Care is related to the following treatment goals: pain control

## 2023-01-07 NOTE — PROGRESS NOTES
NEUROLOGY INPATIENT PROGRESS NOTE    1/7/2023         Current Exam:     Chart reviewed. Discussed with RN. MRI brain and cervical spine completed with no enhancement and no evidence of active MS lesions. Patient started on antibiotics overnight for concerns of UTI. She reports she is feeling better today. Head/neck pain is much improved, currently a 3/10. She feels as though the right sided numbness is also somewhat improved today.         Brief History:    Elizabeth Maria is a  52 y.o. female with H/O MS, who was admitted on 1/6/2023 with concern of MS flare.  The patient reports a history of relapsing remitting possible sclerosis with the onset of 2001.  She has seen neurologists in the past but is currently in between providers as she had a recent insurance change.  In the past she has tried Avonex, Rebif, Copaxone but was not able to tolerate any of these due to side effects.  She is not currently on any disease modifying therapies.  She presents today with complaints of an MS flare that started right around [place] time.  She has right-sided numbness to her arm and leg at baseline but reports this is worse than normal, as well as a feeling of neck pressure/pain starting in the back of her neck and radiating into her right arm.  She also reports a headache and blurred vision with generalized weakness.  She has had increased stress at home lately due to her dad's diagnosis of cancer.  She did move in with him to help care for him.  She does not use a cane or a walker at home but does admit to frequent falls.  No recent imaging available for review in her chart.  She reports typically getting an MS flare approximately every 6 months.  She is typically treated with high-dose steroids with improvement in the symptoms. She denies any dysphagia or trouble breathing.       No current facility-administered medications on file prior to encounter.     Current Outpatient Medications on File Prior to Encounter  Medication Sig Dispense Refill    amLODIPine (NORVASC) 10 MG tablet 1 tablet      ondansetron (ZOFRAN ODT) 4 MG disintegrating tablet Take 1 tablet by mouth every 8 hours as needed for Nausea 10 tablet 0    acetaminophen (TYLENOL) 500 MG tablet Take 1 tablet by mouth 4 times daily as needed for Pain 40 tablet 0    lidocaine (LIDODERM) 5 % Place 1 patch onto the skin daily 12 hours on, 12 hours off. (Patient not taking: Reported on 1/6/2023) 30 patch 0    Multiple Vitamins-Minerals (MULTIVITAMIN ADULT) CHEW Take 1 tablet by mouth daily (Patient not taking: Reported on 1/6/2023) 30 tablet 0    vitamin D (CHOLECALCIFEROL) 1000 UNIT TABS tablet Take 1,000 Units by mouth daily (Patient not taking: Reported on 1/6/2023)      lisinopril (PRINIVIL;ZESTRIL) 20 MG tablet   Take 20 mg by mouth 2 times daily  (Patient not taking: Reported on 1/6/2023)      amphetamine-dextroamphetamine (ADDERALL) 10 MG tablet   Take 20 mg by mouth daily       escitalopram (LEXAPRO) 10 MG tablet Take 10 mg by mouth 2 times daily. Allergies: Renan Samson is allergic to bee venom, tigan [trimethobenzamide hcl], toprol xl [metoprolol], toradol [ketorolac tromethamine], and imitrex [sumatriptan]. Past Medical History:   Diagnosis Date    Anxiety     Depression     Hypertension     Kidney stones     Multiple sclerosis ([facility] [address] 2001    Osteoporosis 2015       Past Surgical History:   Procedure Laterality Date    APPENDECTOMY  2001    CHOLECYSTECTOMY  2000    HYSTERECTOMY (CERVIX STATUS UNKNOWN)  2005    complete with ovary removal    KIDNEY STONE SURGERY  2014    TONSILLECTOMY AND ADENOIDECTOMY  1978       Social History: Renan Samson  reports that she is a non-smoker but has been exposed to tobacco smoke. She has never used smokeless tobacco. She reports that she does not drink alcohol and does not use drugs.     Family History   Problem Relation Age of Onset    Hypertension Mother     Diabetes Mother     Thyroid Disease Mother Mult Sclerosis Father     Kidney Disease Maternal Grandmother        Objective:   /71   Pulse 72   Temp 97.5 °F (36.4 °C) (Oral)   Resp 16   Ht 5' 8\" (1.727 m)   Wt 206 lb (93.4 kg)   SpO2 96%   BMI 31.32 kg/m²     Blood pressure range: Systolic (96DLB), LLI:271 , Min:106 , SOW:490   ; Diastolic (97NFN), VHR:75, Min:68, Max:86      Review of Systems:  Constitutional  Negative for fever and chills    HEENT  Negative for ear discharge, ear pain, nosebleed    Eyes  Negative for photophobia, pain and discharge    Respiratory  Negative for hemoptysis and sputum    Cardiovascular  Negative for orthopnea, claudication and PND    Gastrointestinal  Negative for abdominal pain, diarrhea, blood in stool    Musculoskeletal  Negative for joint pain, negative for myalgia    Skin  Negative for rash or itching    Endo/heme/allergies  Negative for polydipsia, environmental allergy    Psychiatric/behavioral  Negative for suicidal ideation.  Patient is not anxious           NEUROLOGIC EXAMINATION  GENERAL  Appears comfortable and in no distress   HEENT  NC/ AT   NECK  Supple   MENTAL STATUS:  Alert, oriented, intact memory, no confusion, normal speech, normal language, no hallucination or delusion   CRANIAL NERVES: II     -      Visual fields intact to confrontation  III,IV,VI -  EOMs full, no afferent defect, no JAEL, no ptosis  V     -     Normal facial sensation  VII    -     Normal facial symmetry  VIII   -     Intact hearing  IX,X -     Symmetrical palate  XI    -     Symmetrical shoulder shrug  XII   -     Midline tongue, no atrophy    MOTOR FUNCTION:  Giveway to BUE, 5/5 to LLE, 4+/5 to RLE with normal bulk, normal tone and no involuntary movements, no tremor   SENSORY FUNCTION:  Decreased sensation to right arm and leg   CEREBELLAR FUNCTION:  Intact fine motor control over upper limbs   REFLEX FUNCTION:  Symmetric, no perverted reflex, no Babinski sign   STATION and GAIT  Not tested        Data:    Lab Results: CBC:   Recent Labs     01/06/23  1256 01/07/23  0425   WBC 6.8 5.1   HGB 14.7 12.8    206     BMP:    Recent Labs     01/06/23  1256 01/07/23  0425    131*   K 3.9 4.5    98   CO2 23 17*   BUN 12 20   CREATININE 0.58 0.95*   GLUCOSE 156* 466*         Lab Results   Component Value Date    ALT 33 11/12/2022    AST 32 (H) 11/12/2022           Diagnostic data reviewed:  MRI BRAIN (1/7/23) -  Multiple (greater than 5) scattered T2/FLAIR hyperintensities present with a   distribution compatible with history of multiple sclerosis. No prior imaging   available for comparison at time of dictation. No associated diffusion   restriction or contrast enhancement appreciated to suggest active disease. MRI CERVICAL SPINE (1/7/23) -  1. No convincing abnormal spinal cord signal or enhancement appreciated. 2. Mild multilevel cervical spondylosis. Impression:  -Worsening neck pain, blurred vision, right sided numbness, and generalized weakness for the past 2 weeks with no active MS lesions on imaging; suspect worsening of symptoms is secondary to UTI  -UTI  -Headache  -History of MS    Plan:  No active lesions on imaging as above  Awaiting urine culture; continue Rocephin for now  Will discontinue steroids; on insulin sliding scale  PT/OT evals  Continue home medications  DVT prophylaxis; Lovenox    Please note that this note was generated using a voice recognition dictation software. Although every effort was made to ensure the accuracy of this automated transcription, some errors in transcription may have occurred.

## 2023-01-08 PROBLEM — N39.0 UTI (URINARY TRACT INFECTION): Status: ACTIVE | Noted: 2023-01-08

## 2023-01-08 PROBLEM — Z79.4 TYPE 2 DIABETES MELLITUS, WITH LONG-TERM CURRENT USE OF INSULIN (HCC): Status: ACTIVE | Noted: 2023-01-08

## 2023-01-08 PROBLEM — E11.9 TYPE 2 DIABETES MELLITUS, WITH LONG-TERM CURRENT USE OF INSULIN (HCC): Status: ACTIVE | Noted: 2023-01-08

## 2023-01-08 PROBLEM — D72.825 BANDEMIA: Status: ACTIVE | Noted: 2023-01-08

## 2023-01-08 LAB
ABSOLUTE EOS #: <0.03 K/UL (ref 0–0.44)
ABSOLUTE IMMATURE GRANULOCYTE: 0.07 K/UL (ref 0–0.3)
ABSOLUTE LYMPH #: 1.45 K/UL (ref 1.1–3.7)
ABSOLUTE MONO #: 0.7 K/UL (ref 0.1–1.2)
ANION GAP SERPL CALCULATED.3IONS-SCNC: 8 MMOL/L (ref 9–17)
BASOPHILS # BLD: 0 % (ref 0–2)
BASOPHILS ABSOLUTE: <0.03 K/UL (ref 0–0.2)
BUN BLDV-MCNC: 20 MG/DL (ref 6–20)
CALCIUM SERPL-MCNC: 9.6 MG/DL (ref 8.6–10.4)
CHLORIDE BLD-SCNC: 103 MMOL/L (ref 98–107)
CO2: 24 MMOL/L (ref 20–31)
CREAT SERPL-MCNC: 0.71 MG/DL (ref 0.5–0.9)
CULTURE: ABNORMAL
EOSINOPHILS RELATIVE PERCENT: 0 % (ref 1–4)
ESTIMATED AVERAGE GLUCOSE: 146 MG/DL
GFR SERPL CREATININE-BSD FRML MDRD: >60 ML/MIN/1.73M2
GLUCOSE BLD-MCNC: 163 MG/DL (ref 65–105)
GLUCOSE BLD-MCNC: 263 MG/DL (ref 65–105)
GLUCOSE BLD-MCNC: 276 MG/DL (ref 70–99)
GLUCOSE BLD-MCNC: 288 MG/DL (ref 65–105)
GLUCOSE BLD-MCNC: 357 MG/DL (ref 65–105)
HBA1C MFR BLD: 6.7 % (ref 4–6)
HCT VFR BLD CALC: 35.1 % (ref 36.3–47.1)
HEMOGLOBIN: 11.9 G/DL (ref 11.9–15.1)
IMMATURE GRANULOCYTES: 1 %
LYMPHOCYTES # BLD: 11 % (ref 24–43)
MCH RBC QN AUTO: 30.4 PG (ref 25.2–33.5)
MCHC RBC AUTO-ENTMCNC: 33.9 G/DL (ref 28.4–34.8)
MCV RBC AUTO: 89.8 FL (ref 82.6–102.9)
MONOCYTES # BLD: 5 % (ref 3–12)
NRBC AUTOMATED: 0 PER 100 WBC
PDW BLD-RTO: 11.8 % (ref 11.8–14.4)
PLATELET # BLD: 204 K/UL (ref 138–453)
PMV BLD AUTO: 11.2 FL (ref 8.1–13.5)
POTASSIUM SERPL-SCNC: 4.6 MMOL/L (ref 3.7–5.3)
RBC # BLD: 3.91 M/UL (ref 3.95–5.11)
SEG NEUTROPHILS: 83 % (ref 36–65)
SEGMENTED NEUTROPHILS ABSOLUTE COUNT: 11.08 K/UL (ref 1.5–8.1)
SODIUM BLD-SCNC: 135 MMOL/L (ref 135–144)
SPECIMEN DESCRIPTION: ABNORMAL
WBC # BLD: 13.3 K/UL (ref 3.5–11.3)

## 2023-01-08 PROCEDURE — 6370000000 HC RX 637 (ALT 250 FOR IP)

## 2023-01-08 PROCEDURE — 80048 BASIC METABOLIC PNL TOTAL CA: CPT

## 2023-01-08 PROCEDURE — 6360000002 HC RX W HCPCS: Performed by: NURSE PRACTITIONER

## 2023-01-08 PROCEDURE — 97161 PT EVAL LOW COMPLEX 20 MIN: CPT

## 2023-01-08 PROCEDURE — 2580000003 HC RX 258: Performed by: NURSE PRACTITIONER

## 2023-01-08 PROCEDURE — 2580000003 HC RX 258

## 2023-01-08 PROCEDURE — 6360000002 HC RX W HCPCS

## 2023-01-08 PROCEDURE — 99232 SBSQ HOSP IP/OBS MODERATE 35: CPT | Performed by: PSYCHIATRY & NEUROLOGY

## 2023-01-08 PROCEDURE — 6370000000 HC RX 637 (ALT 250 FOR IP): Performed by: NURSE PRACTITIONER

## 2023-01-08 PROCEDURE — 99222 1ST HOSP IP/OBS MODERATE 55: CPT | Performed by: INTERNAL MEDICINE

## 2023-01-08 PROCEDURE — 1200000000 HC SEMI PRIVATE

## 2023-01-08 PROCEDURE — 36415 COLL VENOUS BLD VENIPUNCTURE: CPT

## 2023-01-08 PROCEDURE — 82947 ASSAY GLUCOSE BLOOD QUANT: CPT

## 2023-01-08 PROCEDURE — 6370000000 HC RX 637 (ALT 250 FOR IP): Performed by: PSYCHIATRY & NEUROLOGY

## 2023-01-08 PROCEDURE — 85025 COMPLETE CBC W/AUTO DIFF WBC: CPT

## 2023-01-08 RX ORDER — INSULIN LISPRO 100 [IU]/ML
0-8 INJECTION, SOLUTION INTRAVENOUS; SUBCUTANEOUS
Status: DISCONTINUED | OUTPATIENT
Start: 2023-01-08 | End: 2023-01-09 | Stop reason: HOSPADM

## 2023-01-08 RX ORDER — INSULIN GLARGINE 100 [IU]/ML
20 INJECTION, SOLUTION SUBCUTANEOUS NIGHTLY
Status: DISCONTINUED | OUTPATIENT
Start: 2023-01-08 | End: 2023-01-08

## 2023-01-08 RX ORDER — INSULIN GLARGINE 100 [IU]/ML
10 INJECTION, SOLUTION SUBCUTANEOUS NIGHTLY
Status: DISCONTINUED | OUTPATIENT
Start: 2023-01-08 | End: 2023-01-08

## 2023-01-08 RX ORDER — BUTALBITAL, ACETAMINOPHEN AND CAFFEINE 50; 325; 40 MG/1; MG/1; MG/1
1 TABLET ORAL EVERY 6 HOURS PRN
Status: DISCONTINUED | OUTPATIENT
Start: 2023-01-08 | End: 2023-01-09 | Stop reason: HOSPADM

## 2023-01-08 RX ORDER — INSULIN LISPRO 100 [IU]/ML
0-4 INJECTION, SOLUTION INTRAVENOUS; SUBCUTANEOUS NIGHTLY
Status: DISCONTINUED | OUTPATIENT
Start: 2023-01-08 | End: 2023-01-09 | Stop reason: HOSPADM

## 2023-01-08 RX ORDER — INSULIN GLARGINE 100 [IU]/ML
10 INJECTION, SOLUTION SUBCUTANEOUS NIGHTLY
Status: DISCONTINUED | OUTPATIENT
Start: 2023-01-08 | End: 2023-01-09 | Stop reason: HOSPADM

## 2023-01-08 RX ADMIN — ATORVASTATIN CALCIUM 20 MG: 20 TABLET, FILM COATED ORAL at 20:33

## 2023-01-08 RX ADMIN — AMITRIPTYLINE HYDROCHLORIDE 12.5 MG: 25 TABLET, FILM COATED ORAL at 20:34

## 2023-01-08 RX ADMIN — DEXTROAMPHETAMINE SACCHARATE, AMPHETAMINE ASPARTATE, DEXTROAMPHETAMINE SULFATE, AMPHETAMINE SULFATE TABLETS, 10 MG,CLL 10 MG: 2.5; 2.5; 2.5; 2.5 TABLET ORAL at 17:22

## 2023-01-08 RX ADMIN — ESCITALOPRAM OXALATE 20 MG: 10 TABLET ORAL at 09:39

## 2023-01-08 RX ADMIN — PANTOPRAZOLE SODIUM 40 MG: 40 TABLET, DELAYED RELEASE ORAL at 09:39

## 2023-01-08 RX ADMIN — INSULIN GLARGINE 10 UNITS: 100 INJECTION, SOLUTION SUBCUTANEOUS at 20:41

## 2023-01-08 RX ADMIN — ACETAMINOPHEN 650 MG: 325 TABLET ORAL at 15:07

## 2023-01-08 RX ADMIN — ACETAMINOPHEN 650 MG: 325 TABLET ORAL at 09:46

## 2023-01-08 RX ADMIN — INSULIN LISPRO 2 UNITS: 100 INJECTION, SOLUTION INTRAVENOUS; SUBCUTANEOUS at 12:08

## 2023-01-08 RX ADMIN — SPIRONOLACTONE 25 MG: 25 TABLET ORAL at 09:39

## 2023-01-08 RX ADMIN — INSULIN LISPRO 2 UNITS: 100 INJECTION, SOLUTION INTRAVENOUS; SUBCUTANEOUS at 09:40

## 2023-01-08 RX ADMIN — TIZANIDINE 2 MG: 2 TABLET ORAL at 20:34

## 2023-01-08 RX ADMIN — ENOXAPARIN SODIUM 40 MG: 100 INJECTION SUBCUTANEOUS at 09:40

## 2023-01-08 RX ADMIN — BUTALBITAL, ACETAMINOPHEN AND CAFFEINE 1 TABLET: 50; 325; 40 TABLET ORAL at 15:08

## 2023-01-08 RX ADMIN — INSULIN LISPRO 4 UNITS: 100 INJECTION, SOLUTION INTRAVENOUS; SUBCUTANEOUS at 17:19

## 2023-01-08 RX ADMIN — SODIUM CHLORIDE, PRESERVATIVE FREE 10 ML: 5 INJECTION INTRAVENOUS at 09:43

## 2023-01-08 RX ADMIN — CEFTRIAXONE SODIUM 1000 MG: 10 INJECTION, POWDER, FOR SOLUTION INTRAVENOUS at 02:25

## 2023-01-08 ASSESSMENT — PAIN SCALES - GENERAL
PAINLEVEL_OUTOF10: 5
PAINLEVEL_OUTOF10: 3
PAINLEVEL_OUTOF10: 5

## 2023-01-08 ASSESSMENT — ENCOUNTER SYMPTOMS
GASTROINTESTINAL NEGATIVE: 1
EYES NEGATIVE: 1
RESPIRATORY NEGATIVE: 1

## 2023-01-08 ASSESSMENT — PAIN DESCRIPTION - LOCATION
LOCATION: HEAD
LOCATION: HEAD

## 2023-01-08 NOTE — CONSULTS
[facility] CONSULT    Patient:   Lucinda Tim   :    1970   Facility:   [facility]   Date:    2023  Admission Dx:  [facility]  Requesting physician: Garry Dc,*  Reason for consult:  Epigastric pain, patient requesting evaluation   CC : \"Right-sided numbness, blurry vision\"    SUBJECTIVE     HISTORY OF PRESENT ILLNESS  This is a 45 y.o.  female who was admitted 2023 with Multiple sclerosis ([facility] [address] [facility]. We have been asked to see the patient in consultation by Garry Dc,* for epigastric pain, patient requesting evaluation    68-year-old female with a PMH of MS, HTN, anxiety, depression who presented to ED with reports of right-sided numbness in her arms and legs headache, blurred vision concerning for MS flare and found to have UTI. Patient reported intractable epigastric abdominal pain prompting GI consult. Patient reports for the past 3 or 4 months she has been experiencing constant, nagging, nonradiating epigastric pain. She reports a past history of heartburn symptoms though this is not similar. She reports associated solid food dysphagia, no odynophagia. She has constant clearing of her throat, bloating and fullness sensation. She reports she has tried over-the-counter Tums as well as Pepcid x1 month without any significant improvement. She denies any associated weight loss. No prior EGD.     Location/Symptom: Epigastric pain  Timing/Onset: 3 to 4 months ago  Provocation: No known factors  Quality: Constant \"nagging\"  Radiation: Nonradiating  Severity: Mild to moderate  Timing/Duration: 3 to 4 months  Modifying Factors: Tums and Pepcid without relief      Previous GI history:   No prior EGD  Remote colonoscopy for unclear etiology      OBJECTIVE:     PAST MEDICAL/SURGICAL HISTORY  Past Medical History:   Diagnosis Date    Anxiety     Depression Hypertension     Kidney stones     Multiple sclerosis ([facility] [address] 2001    Osteoporosis 2015     Past Surgical History:   Procedure Laterality Date    APPENDECTOMY  2001    CHOLECYSTECTOMY  2000    HYSTERECTOMY (CERVIX STATUS UNKNOWN)  2005    complete with ovary removal    KIDNEY STONE SURGERY  2014    TONSILLECTOMY AND ADENOIDECTOMY  1978       ALLERGIES:  Allergies   Allergen Reactions    Bee Venom     Tigan [Trimethobenzamide Hcl] Other (See Comments)     Does not remember reaction    Toprol Xl [Metoprolol] Other (See Comments)     Does not remember reaction    Toradol [Ketorolac Tromethamine] Other (See Comments)     hallucination    Imitrex [Sumatriptan] Nausea And Vomiting       HOME MEDICATIONS:  Prior to Admission medications    Medication Sig Start Date End Date Taking? Authorizing Provider   amLODIPine (NORVASC) 10 MG tablet 1 tablet    Historical Provider, MD   ondansetron (ZOFRAN ODT) 4 MG disintegrating tablet Take 1 tablet by mouth every 8 hours as needed for Nausea 11/12/22   Harshil Son, MD   acetaminophen (TYLENOL) 500 MG tablet Take 1 tablet by mouth 4 times daily as needed for Pain 1/18/22 1/28/22  Leo Coma, DO   lidocaine (LIDODERM) 5 % Place 1 patch onto the skin daily 12 hours on, 12 hours off.   Patient not taking: Reported on 1/6/2023 1/18/22   Leo Coma, DO   Multiple Vitamins-Minerals (MULTIVITAMIN ADULT) CHEW Take 1 tablet by mouth daily  Patient not taking: Reported on 1/6/2023 1/18/22   Leo Coma, DO   vitamin D (CHOLECALCIFEROL) 1000 UNIT TABS tablet Take 1,000 Units by mouth daily  Patient not taking: Reported on 1/6/2023    Historical Provider, MD   lisinopril (PRINIVIL;ZESTRIL) 20 MG tablet   Take 20 mg by mouth 2 times daily   Patient not taking: Reported on 1/6/2023 4/22/15   Historical Provider, MD   amphetamine-dextroamphetamine (ADDERALL) 10 MG tablet   Take 20 mg by mouth daily     Historical Provider, MD   escitalopram (LEXAPRO) 10 MG tablet Take 10 mg by mouth 2 times daily. Historical Provider, MD       CURRENT MEDICATIONS:  Scheduled Meds:   cefTRIAXone (ROCEPHIN) IV  1,000 mg IntraVENous Q24H    insulin lispro  0-4 Units SubCUTAneous TID WC    insulin lispro  0-4 Units SubCUTAneous Nightly    pantoprazole  40 mg Oral QAM AC    amitriptyline  12.5 mg Oral Nightly    sodium chloride flush  5-40 mL IntraVENous 2 times per day    enoxaparin  40 mg SubCUTAneous Daily    amLODIPine  10 mg Oral Daily    amphetamine-dextroamphetamine  10 mg Oral BID AC    escitalopram  20 mg Oral Daily    spironolactone  25 mg Oral Daily    atorvastatin  20 mg Oral Nightly    tiZANidine  2 mg Oral Nightly     Continuous Infusions:   dextrose      sodium chloride       PRN Meds:glucose, dextrose bolus **OR** dextrose bolus, glucagon (rDNA), dextrose, sodium chloride flush, sodium chloride flush, sodium chloride, ondansetron **OR** ondansetron, polyethylene glycol, acetaminophen **OR** acetaminophen    SOCIAL HISTORY:     Tobacco:   reports that she is a non-smoker but has been exposed to tobacco smoke. She has never used smokeless tobacco.  Alcohol:   reports no history of alcohol use. Illicit drugs:  reports no history of drug use. FAMILY HISTORY:     Family History   Problem Relation Age of Onset    Hypertension Mother     Diabetes Mother     Thyroid Disease Mother     Mult Sclerosis Father     Kidney Disease Maternal Grandmother        REVIEW OF SYSTEMS:    Constitutional: No fever, no chills, no lethargy, no weakness. HEENT:  + headache, no otalgia, itchy eyes, nasal discharge or sore throat. Cardiac:  No chest pain, dyspnea, orthopnea or PND. Chest:   No cough, phlegm or wheezing. Abdomen:  Epigastric abdominal pain, no nausea or vomiting, + dysphagia and bloating. Neuro:  No focal weakness, abnormal movements or seizure like activity. Skin:   No rashes, no itching. :   No hematuria, no pyuria, no dysuria, no flank pain. Extremities:  No swelling or joint pains.  + Right-sided extremity weakness  ROS was otherwise negative except as mentioned in the [address] PHYSICAL EXAM:    /75   Pulse 85   Temp 98.4 °F (36.9 °C) (Oral)   Resp 18   Ht 5' 8\" (1.727 m)   Wt 206 lb (93.4 kg)   SpO2 97%   BMI 31.32 kg/m²     GENERAL:   Well developed, Well nourished, No apparent distress  HEAD:   Normocephalic, Atraumatic  EENT:   EOMI, Sclera not icteric, Oropharynx moist   NECK:  Supple, Trachea midline  LUNGS:  CTA Bilaterally  HEART:  S1, S2, RRR, No murmur appreciated on exam  ABDOMEN:  Soft, obese, Nondistended, BS +  EXT:  No clubbing. No cyanosis. No edema. SKIN:   No rashes. No jaundice. No stigmata of liver disease. MUSC/SKEL:   Adequate muscle bulk for patient's age, No significant synovitis, No deformities  NEURO:  A&O x Three, CN II- XII grossly intact      LABS AND IMAGING:     CBC  Recent Labs     01/06/23  1256 01/07/23  0425 01/08/23  0600   WBC 6.8 5.1 13.3*   HGB 14.7 12.8 11.9   HCT 43.5 39.5 35.1*   MCV 90.1 92.7 89.8   MCH 30.4 30.0 30.4   MCHC 33.8 32.4 33.9    206 204       IMMATURE PLTs  No results found for: PLTFLUORE    BMP  Recent Labs     01/06/23  1256 01/07/23  0425 01/08/23  0600    131* 135   K 3.9 4.5 4.6    98 103   CO2 23 17* 24   BUN 12 20 20   CREATININE 0.58 0.95* 0.71   GLUCOSE 156* 466* 276*   CALCIUM 9.7 9.3 9.6       LFTS  No results for input(s): ALKPHOS, BILITOT, BILIDIR, AST, ALT, PROT, LABALBU in the last 72 hours. AMYLASE/LIPASE/AMMONIA  No results for input(s): AMYLASE, LIPASE, AMMONIA in the last 72 hours. PT/INR  No results for input(s): PROTIME, INR in the last 72 hours. ANEMIA STUDIES  No results for input(s): IRON, LABIRON, TIBC, UIBC, FERRITIN, [id number], FOLATE, OCCULTBLD in the last 72 hours.       LIVER WORK UP:    Acute Hepatitis Panel   No results found for: HEPBSAG, HEPCAB, HEPBIGM, HEPAIGM    HCV Genotype   No results found for: HEPATITISCGENOTYPE    HCV Quantitative   No results found for: HCVQNT    AFP  No results found for: AFP    Alpha 1 antitrypsin   No results found for: A1A    Anti - Liver/Kidney Ab  No results found for: LIVER-KIDNEYMICROSOMALAB    ELEANOR  No results found for: ELEANOR    AMA  No results found for: MITOAB    ASMA  No results found for: SMOOTHMUSCAB    Ceruloplasmin  No results found for: CERULOPLSM    Celiac panel  No results found for: TISSTRNTIIGG, TTGIGA, IGA    IgG  No results found for: IGG    IgM  No results found for: IGM    GGT   No results found for: LABGGT    PT/INR  No results for input(s): PROTIME, INR in the last 72 hours. Cancer Markers:  CEA:  No results found for: CEA  Ca 125:  No results found for:   Ca 19-9:   No results found for:   AFP: No results found for: AFP    Lactic acid:No results for input(s): LACTACIDWB in the last 72 hours. IMAGING  MRI CERVICAL SPINE W WO CONTRAST    Result Date: 1/7/2023  EXAMINATION: MRI OF THE CERVICAL SPINE WITHOUT AND WITH CONTRAST  1/7/2023 8:24 am: TECHNIQUE: Multiplanar multisequence MRI of the cervical spine was performed without and with the administration of intravenous contrast. COMPARISON: None available at time of dictation. HISTORY: ORDERING SYSTEM PROVIDED HISTORY: concern of MS flare, reported history of MS TECHNOLOGIST PROVIDED HISTORY: concern of MS flare, reported history of MS Decision Support Exception - unselect if not a suspected or confirmed emergency medical condition->Emergency Medical Condition (MA) Reason for Exam: concern of MS flare, reported history of MS FINDINGS: BONES/ALIGNMENT: Straightening of the cervical spine. No significant spondylolisthesis. Vertebral body heights appear preserved. Marrow signal appears within normal limits. No evidence of acute fracture or aggressive appearing osseous lesions. SPINAL CORD: No convincing abnormal spinal cord signal or enhancement appreciated. SOFT TISSUES: No abnormal enhancement of the cervical spine. No paraspinal mass identified. C2-C3: No significant spinal canal stenosis or foraminal narrowing. C3-C4: Uncovertebral hypertrophy. No significant spinal canal stenosis. Mild left foraminal narrowing. C4-C5: No significant spinal canal stenosis or foraminal narrowing. C5-C6: Disc osteophyte complex eccentric to the right. No significant spinal stenosis. Mild right foraminal narrowing. C6-C7: No significant spinal canal stenosis or foraminal narrowing. C7-T1: No significant spinal canal stenosis or foraminal narrowing. 1. No convincing abnormal spinal cord signal or enhancement appreciated. 2. Mild multilevel cervical spondylosis. XR CHEST PORTABLE    Result Date: 1/6/2023  EXAMINATION: ONE XRAY VIEW OF THE CHEST 1/6/2023 1:05 pm COMPARISON: 01/18/2022 HISTORY: ORDERING SYSTEM PROVIDED HISTORY: sob TECHNOLOGIST PROVIDED HISTORY: sob Reason for Exam: SOB port upr at 1pm FINDINGS: Cardiomediastinal silhouette appears within normal limits. No focal consolidation or overt pulmonary edema. Costophrenic angles are sharp. No evidence of pneumothorax. No acute osseous abnormalities. No radiographic evidence of an acute cardiopulmonary process. MRI BRAIN W WO CONTRAST    Result Date: 1/7/2023  EXAMINATION: MRI OF THE BRAIN WITHOUT AND WITH CONTRAST  1/7/2023 8:24 am TECHNIQUE: Multiplanar multisequence MRI of the head/brain was performed without and with the administration of intravenous contrast. COMPARISON: None available at time of dictation.  HISTORY: ORDERING SYSTEM PROVIDED HISTORY: concern for MS flare TECHNOLOGIST PROVIDED HISTORY: concern for MS flare Decision Support Exception - unselect if not a suspected or confirmed emergency medical condition->Emergency Medical Condition (MA) Reason for Exam: concern for MS flare FINDINGS: INTRACRANIAL STRUCTURES/VENTRICLES: Multiple (greater than 5) scattered supratentorial T2/FLAIR hyperintensities present oriented perpendicular to the lateral ventricles compatible with history of multiple sclerosis. No associated diffusion restriction or contrast enhancement appreciated. There is no acute infarct. No mass effect or midline shift. No evidence of an acute intracranial hemorrhage. The ventricles and sulci are normal in size and configuration. The sellar/suprasellar regions appear unremarkable. The normal signal voids within the major intracranial vessels appear maintained. No abnormal focus of enhancement is seen within the brain. ORBITS: The visualized portion of the orbits demonstrate no acute abnormality. SINUSES: Right maxillary sinus retention cyst.  The mastoid air cells appear clear. No associated diffusion BONES/SOFT TISSUES: The bone marrow signal intensity appears normal. The soft tissues demonstrate no acute abnormality. Multiple (greater than 5) scattered T2/FLAIR hyperintensities present with a distribution compatible with history of multiple sclerosis. No prior imaging available for comparison at time of dictation. No associated diffusion restriction or contrast enhancement appreciated to suggest active disease. IMPRESSION:     1. Epigastric abdominal pain  2. Esophageal dysphagia -dysphagia to solid food  3. Bloating  4. Globus sensation  5. MS  6. Obesity ( BMI 31.32)    DDX -dyspepsia, esophagitis, esophageal stricture      Old records, labs and imaging reviewed. PLAN/ Recommendations:   1. We will plan for EGD tomorrow. Discussed with patient who is agreeable. 2. Continue PPI 40 mg once daily  3. Recommend patient chew food well and take plenty of PO fluids  4. Avoid carbonated beverages  5. NPO after midnight  6.  Further plans to follow EGD      Initial care time/code: Spent 60 out of 60 minutes on this date of service including chart prep, patient interaction, discussion with primary team, documentation and coordination of care for the above conditions    This plan was formulated in collaboration with Dr. Charles  Thank you for allowing us to participate in the care of your patient. Electronically signed by: MALCOLM Palmer CNP on 1/8/2023 at 6:48 AM     Please note that this note was generated using a voice recognition dictation software. Although every effort was made to ensure the accuracy of this automated transcription, some errors in transcription may have occurred.

## 2023-01-08 NOTE — PROGRESS NOTES
NEUROLOGY INPATIENT PROGRESS NOTE    1/8/2023         Current Exam:     Chart reviewed. Discussed with RN. Started on Elavil last evening. GI consulted per patient request due to complaints of epigastric pain and are planning for an EGD tomorrow. Patient reports continued headache, currently a 5/10. Otherwise her complaints of numbness and weakness are improving. She continues on Rocephin for UTI. Brief History: Deborah Merlos is a  45 y.o. female with H/O MS, who was admitted on 1/6/2023 with concern of MS flare. The patient reports a history of relapsing remitting possible sclerosis with the onset of 2001. She has seen neurologists in the past but is currently in between providers as she had a recent insurance change. In the past she has tried Avonex, Rebif, Copaxone but was not able to tolerate any of these due to side effects. She is not currently on any disease modifying therapies. She presents today with complaints of an MS flare that started right around [place] time. She has right-sided numbness to her arm and leg at baseline but reports this is worse than normal, as well as a feeling of neck pressure/pain starting in the back of her neck and radiating into her right arm. She also reports a headache and blurred vision with generalized weakness. She has had increased stress at home lately due to her dad's diagnosis of cancer. She did move in with him to help care for him. She does not use a cane or a walker at home but does admit to frequent falls. No recent imaging available for review in her chart. She reports typically getting an MS flare approximately every 6 months. She is typically treated with high-dose steroids with improvement in the symptoms. She denies any dysphagia or trouble breathing. MRI brain and cervical spine were negative for any evidence of an active lesion. She was found to have a UTI and was started on antibiotics.         No current facility-administered medications on file prior to encounter. Current Outpatient Medications on File Prior to Encounter   Medication Sig Dispense Refill    amLODIPine (NORVASC) 10 MG tablet 1 tablet      ondansetron (ZOFRAN ODT) 4 MG disintegrating tablet Take 1 tablet by mouth every 8 hours as needed for Nausea 10 tablet 0    acetaminophen (TYLENOL) 500 MG tablet Take 1 tablet by mouth 4 times daily as needed for Pain 40 tablet 0    lidocaine (LIDODERM) 5 % Place 1 patch onto the skin daily 12 hours on, 12 hours off. (Patient not taking: Reported on 1/6/2023) 30 patch 0    Multiple Vitamins-Minerals (MULTIVITAMIN ADULT) CHEW Take 1 tablet by mouth daily (Patient not taking: Reported on 1/6/2023) 30 tablet 0    vitamin D (CHOLECALCIFEROL) 1000 UNIT TABS tablet Take 1,000 Units by mouth daily (Patient not taking: Reported on 1/6/2023)      lisinopril (PRINIVIL;ZESTRIL) 20 MG tablet   Take 20 mg by mouth 2 times daily  (Patient not taking: Reported on 1/6/2023)      amphetamine-dextroamphetamine (ADDERALL) 10 MG tablet   Take 20 mg by mouth daily       escitalopram (LEXAPRO) 10 MG tablet Take 10 mg by mouth 2 times daily. Allergies: Pierre Simon is allergic to bee venom, tigan [trimethobenzamide hcl], toprol xl [metoprolol], toradol [ketorolac tromethamine], and imitrex [sumatriptan]. Past Medical History:   Diagnosis Date    Anxiety     Depression     Hypertension     Kidney stones     Multiple sclerosis ([facility] [address] 2001    Osteoporosis 2015       Past Surgical History:   Procedure Laterality Date    APPENDECTOMY  2001    CHOLECYSTECTOMY  2000    HYSTERECTOMY (CERVIX STATUS UNKNOWN)  2005    complete with ovary removal    KIDNEY STONE SURGERY  2014    TONSILLECTOMY AND ADENOIDECTOMY  1978       Social History: Pierre Simon  reports that she is a non-smoker but has been exposed to tobacco smoke. She has never used smokeless tobacco. She reports that she does not drink alcohol and does not use drugs.     Family History Problem Relation Age of Onset    Hypertension Mother     Diabetes Mother     Thyroid Disease Mother     Mult Sclerosis Father     Kidney Disease Maternal Grandmother        Objective:   /65   Pulse 68   Temp 97.2 °F (36.2 °C)   Resp 16   Ht 5' 8\" (1.727 m)   Wt 206 lb (93.4 kg)   SpO2 96%   BMI 31.32 kg/m²     Blood pressure range: Systolic (53FVQ), FQE:513 , Min:104 , FQA:491   ; Diastolic (31PXR), ADRIÁN:91, Min:65, Max:75      Review of Systems:  Constitutional  Negative for fever and chills    HEENT  Negative for ear discharge, ear pain, nosebleed    Eyes  Negative for photophobia, pain and discharge    Respiratory  Negative for hemoptysis and sputum    Cardiovascular  Negative for orthopnea, claudication and PND    Gastrointestinal  Negative for abdominal pain, diarrhea, blood in stool    Musculoskeletal  Negative for joint pain, negative for myalgia    Skin  Negative for rash or itching    Endo/heme/allergies  Negative for polydipsia, environmental allergy    Psychiatric/behavioral  Negative for suicidal ideation.  Patient is not anxious           NEUROLOGIC EXAMINATION  GENERAL  Appears comfortable and in no distress   HEENT  NC/ AT   NECK  Supple   MENTAL STATUS:  Alert, oriented, intact memory, no confusion, normal speech, normal language, no hallucination or delusion   CRANIAL NERVES: II     -      Visual fields intact to confrontation  III,IV,VI -  EOMs full, no afferent defect, no JAEL, no ptosis  V     -     Normal facial sensation  VII    -     Normal facial symmetry  VIII   -     Intact hearing  IX,X -     Symmetrical palate  XI    -     Symmetrical shoulder shrug  XII   -     Midline tongue, no atrophy    MOTOR FUNCTION:  Giveway to BUE, 5/5 to LLE, 4+/5 to RLE with normal bulk, normal tone and no involuntary movements, no tremor   SENSORY FUNCTION:  Decreased sensation to right arm and leg   CEREBELLAR FUNCTION:  Intact fine motor control over upper limbs   REFLEX FUNCTION:  Symmetric, no perverted reflex, no Babinski sign   STATION and GAIT  Not tested        Data:    Lab Results:   CBC:   Recent Labs     01/06/23  1256 01/07/23  0425 01/08/23  0600   WBC 6.8 5.1 13.3*   HGB 14.7 12.8 11.9    206 204     BMP:    Recent Labs     01/06/23  1256 01/07/23  0425 01/08/23  0600    131* 135   K 3.9 4.5 4.6    98 103   CO2 23 17* 24   BUN 12 20 20   CREATININE 0.58 0.95* 0.71   GLUCOSE 156* 466* 276*         Lab Results   Component Value Date    ALT 33 11/12/2022    AST 32 (H) 11/12/2022           Diagnostic data reviewed:  MRI BRAIN (1/7/23) -  Multiple (greater than 5) scattered T2/FLAIR hyperintensities present with a   distribution compatible with history of multiple sclerosis. No prior imaging   available for comparison at time of dictation. No associated diffusion   restriction or contrast enhancement appreciated to suggest active disease. MRI CERVICAL SPINE (1/7/23) -  1. No convincing abnormal spinal cord signal or enhancement appreciated. 2. Mild multilevel cervical spondylosis. Impression:  -Worsening neck pain, blurred vision, right sided numbness, and generalized weakness for the past 2 weeks with no active MS lesions on imaging; suspect worsening of symptoms is secondary to UTI  -UTI  -Headache  -History of MS  -Epigastric pain    Plan:  No active lesions on imaging as above  Awaiting urine culture; continue Rocephin for now  Will discontinue steroids; on insulin sliding scale  GI consulted and planning for EGD tomorrow 1/9/23  PT/OT evals  Continue home medications  DVT prophylaxis; Lovenox    Please note that this note was generated using a voice recognition dictation software. Although every effort was made to ensure the accuracy of this automated transcription, some errors in transcription may have occurred.

## 2023-01-08 NOTE — CONSULTS
[facility]     Department of Internal Medicine - Staff Internal Medicine Teaching Service          Consult Note  Date: 1/8/2023  Patient Name: Elizabeth Maria  Date of admission: 1/6/2023 12:39 PM  YOB: 1970  PCP: Toño Luevano PA-C  History Obtained From:  patient, electronic medical record    Consult Reason:     Consult Reason: Management of diabetes mellitus    HISTORY OF PRESENTING ILLNESS     The patient is a pleasant 52 y.o. female with history of multiple sclerosis, HTN, osteoporosis, kidney stones and anxiety was admitted with neurology on 01/06/2023 for management of MS flare.  She has a history of relapsing remitting multiple sclerosis since 2001 and has been under treatment per neurology in the past.  Patient states that currently she is in between switching neurologist and has an upcoming appointment in February with a new neurologist.  She has not been on any disease modifying therapy lately.  She has been experiencing symptoms of blurred vision and right upper extremity numbness for the last 2 weeks.  He has been started on tizanidine with good response.  She was also found to have urinary tract infection and started on IV Rocephin blood cultures still pending.  Internal medicine has been consulted because of ongoing hyperglycemia and elevated HbA1c with concern of type 2 diabetes mellitus requiring management.  The patient she previously had issues of uncontrolled blood sugars every time she used steroids.  She does mentions that she was seen by endocrinology outpatient but never diagnosed with diabetes.  She complains of urinary frequency and epigastric discomfort.  Currently on treatment with IV Rocephin for UTI.  Ongoing GI work-up for dysphagia/dyspepsia.    Review of Systems   Constitutional: Negative.    HENT: Negative.     Eyes: Negative.    Respiratory: Negative.     Cardiovascular: Negative.    Gastrointestinal: Negative.    Endocrine:  Negative. Genitourinary: Negative. Musculoskeletal: Negative. Neurological: Negative. Psychiatric/Behavioral: Negative. PAST MEDICAL HISTORY     Past Medical History:   Diagnosis Date    Anxiety     Depression     Hypertension     Kidney stones     Multiple sclerosis ([address] 2001    Osteoporosis 2015       PAST SURGICAL HISTORY     Past Surgical History:   Procedure Laterality Date    APPENDECTOMY  2001    CHOLECYSTECTOMY  2000    HYSTERECTOMY (CERVIX STATUS UNKNOWN)  2005    complete with ovary removal    KIDNEY STONE SURGERY  2014    TONSILLECTOMY AND ADENOIDECTOMY  1978       ALLERGIES     Bee venom, Tigan [trimethobenzamide hcl], Toprol xl [metoprolol], Toradol [ketorolac tromethamine], and Imitrex [sumatriptan]    MEDICATIONS PRIOR TO ADMISSION     Prior to Admission medications    Medication Sig Start Date End Date Taking? Authorizing Provider   amLODIPine (NORVASC) 10 MG tablet 1 tablet    Historical Provider, MD   ondansetron (ZOFRAN ODT) 4 MG disintegrating tablet Take 1 tablet by mouth every 8 hours as needed for Nausea 11/12/22   Lazaro Rosenthal MD   acetaminophen (TYLENOL) 500 MG tablet Take 1 tablet by mouth 4 times daily as needed for Pain 1/18/22 1/28/22  Kurtis Vivas DO   lidocaine (LIDODERM) 5 % Place 1 patch onto the skin daily 12 hours on, 12 hours off.   Patient not taking: Reported on 1/6/2023 1/18/22   Kurtis Vivas DO   Multiple Vitamins-Minerals (MULTIVITAMIN ADULT) CHEW Take 1 tablet by mouth daily  Patient not taking: Reported on 1/6/2023 1/18/22   Kurtis Vivas DO   vitamin D (CHOLECALCIFEROL) 1000 UNIT TABS tablet Take 1,000 Units by mouth daily  Patient not taking: Reported on 1/6/2023    Historical Provider, MD   lisinopril (PRINIVIL;ZESTRIL) 20 MG tablet   Take 20 mg by mouth 2 times daily   Patient not taking: Reported on 1/6/2023 4/22/15   Historical Provider, MD   amphetamine-dextroamphetamine (ADDERALL) 10 MG tablet   Take 20 mg by mouth daily Historical Provider, MD   escitalopram (LEXAPRO) 10 MG tablet Take 10 mg by mouth 2 times daily. Historical Provider, MD       SOCIAL HISTORY     Tobacco: None  Alcohol: None  Illicits: Never    FAMILY HISTORY     Family History   Problem Relation Age of Onset    Hypertension Mother     Diabetes Mother     Thyroid Disease Mother     Mult Sclerosis Father     Kidney Disease Maternal Grandmother        PHYSICAL EXAM     Vitals: /65   Pulse 68   Temp 97.2 °F (36.2 °C)   Resp 16   Ht 5' 8\" (1.727 m)   Wt 206 lb (93.4 kg)   SpO2 96%   BMI 31.32 kg/m²   Tmax: Temp (24hrs), Av.8 °F (36.6 °C), Min:97.2 °F (36.2 °C), Max:98.4 °F (36.9 °C)    Last Body weight:   Wt Readings from Last 3 Encounters:   23 206 lb (93.4 kg)   23 200 lb (90.7 kg)   22 200 lb (90.7 kg)     Body Mass Index : Body mass index is 31.32 kg/m². PHYSICAL EXAMINATION:  Constitutional: This is a well developed, well nourished,  45y.o. year old female who is alert, oriented, cooperative and in no apparent distress. Head:normocephalic and atraumatic. EENT:  PERRLA. No conjunctival injections. Septum was midline, mucosa was without erythema, exudates or cobblestoning. No thrush was noted. Neck: Supple without thyromegaly. No elevated JVP. Trachea was midline. Respiratory: Chest was symmetrical without dullness to percussion. Breath sounds bilaterally were clear to auscultation. There were no wheezes, rhonchi or rales. There is no intercostal retraction or use of accessory muscles. No egophony noted. Cardiovascular: Regular without murmur, clicks, gallops or rubs. Abdomen: Slightly rounded and soft without organomegaly. No rebound, rigidity or guarding was appreciated. Lymphatic: No lymphadenopathy. Musculoskeletal: Normal curvature of the spine. No gross muscle weakness. Extremities:  No lower extremity edema, ulcerations, tenderness, varicosities or erythema.   Muscle size, tone and strength are normal.  No involuntary movements are noted. Skin:  Warm and dry. Good color, turgor and pigmentation. No lesions or scars.   No cyanosis or clubbing  Neurological/Psychiatric: The patient's general behavior, level of consciousness, thought content and emotional status is normal.        INVESTIGATIONS     Laboratory Testing:     Recent Results (from the past 24 hour(s))   POC Glucose Fingerstick    Collection Time: 01/07/23  9:08 PM   Result Value Ref Range    POC Glucose 350 (H) 65 - 105 mg/dL   Basic Metabolic Panel w/ Reflex to MG    Collection Time: 01/08/23  6:00 AM   Result Value Ref Range    Glucose 276 (H) 70 - 99 mg/dL    BUN 20 6 - 20 mg/dL    Creatinine 0.71 0.50 - 0.90 mg/dL    Est, Glom Filt Rate >60 >60 mL/min/1.73m2    Calcium 9.6 8.6 - 10.4 mg/dL    Sodium 135 135 - 144 mmol/L    Potassium 4.6 3.7 - 5.3 mmol/L    Chloride 103 98 - 107 mmol/L    CO2 24 20 - 31 mmol/L    Anion Gap 8 (L) 9 - 17 mmol/L   CBC with Auto Differential    Collection Time: 01/08/23  6:00 AM   Result Value Ref Range    WBC 13.3 (H) 3.5 - 11.3 k/uL    RBC 3.91 (L) 3.95 - 5.11 m/uL    Hemoglobin 11.9 11.9 - 15.1 g/dL    Hematocrit 35.1 (L) 36.3 - 47.1 %    MCV 89.8 82.6 - 102.9 fL    MCH 30.4 25.2 - 33.5 pg    MCHC 33.9 28.4 - 34.8 g/dL    RDW 11.8 11.8 - 14.4 %    Platelets 346 792 - 282 k/uL    MPV 11.2 8.1 - 13.5 fL    NRBC Automated 0.0 0.0 per 100 WBC    Seg Neutrophils 83 (H) 36 - 65 %    Lymphocytes 11 (L) 24 - 43 %    Monocytes 5 3 - 12 %    Eosinophils % 0 (L) 1 - 4 %    Basophils 0 0 - 2 %    Immature Granulocytes 1 (H) 0 %    Segs Absolute 11.08 (H) 1.50 - 8.10 k/uL    Absolute Lymph # 1.45 1.10 - 3.70 k/uL    Absolute Mono # 0.70 0.10 - 1.20 k/uL    Absolute Eos # <0.03 0.00 - 0.44 k/uL    Basophils Absolute <0.03 0.00 - 0.20 k/uL    Absolute Immature Granulocyte 0.07 0.00 - 0.30 k/uL   POC Glucose Fingerstick    Collection Time: 01/08/23  7:49 AM   Result Value Ref Range    POC Glucose 263 (H) 65 - 105 mg/dL POC Glucose Fingerstick    Collection Time: 01/08/23 12:07 PM   Result Value Ref Range    POC Glucose 288 (H) 65 - 105 mg/dL   POC Glucose Fingerstick    Collection Time: 01/08/23  4:42 PM   Result Value Ref Range    POC Glucose 357 (H) 65 - 105 mg/dL       Imaging:   MRI CERVICAL SPINE W WO CONTRAST    Result Date: 1/7/2023  1. No convincing abnormal spinal cord signal or enhancement appreciated. 2. Mild multilevel cervical spondylosis. XR CHEST PORTABLE    Result Date: 1/6/2023  No radiographic evidence of an acute cardiopulmonary process. MRI BRAIN W WO CONTRAST    Result Date: 1/7/2023  Multiple (greater than 5) scattered T2/FLAIR hyperintensities present with a distribution compatible with history of multiple sclerosis. No prior imaging available for comparison at time of dictation. No associated diffusion restriction or contrast enhancement appreciated to suggest active disease. ASSESSMENT & PLAN     ASSESSMENT / PLAN:     Thank you for allowing us to see Irina Malhotra in consultation for diabetes mellitus management. Type 2 diabetes mellitus: HbA1c 6.7. Persistently hyperglycemic on this admission. Previously was discharged on 20 units Lantus daily from [facility] with outpatient follow-up in endocrinology clinic. We will restart insulin Lantus 20 units at night and also keep low-dose sliding scale along with bedtime short-acting insulin. Recommend POC glucose checks and hypoglycemia protocol. Also recommend encouraging diabetic diet. We will plan for switching to metformin on discharge. Multiple sclerosis exacerbation and migraine headaches: Management as per primary team  UTI: On IV Rocephin for 5 days. Dyspepsia/GERD: GI consulted, plan for EGD tomorrow  Essential hypertension:  On amlodipine 10 mg daily and spironolactone 25 mg daily  Hyperlipidemia: On atorvastatin 20 mg daily  Anxiety/depression: Continue escitalopram and amitriptyline      Treatment plan discussed with the patient and agrees. Kindly reach out if you have any further queries regarding patient's management. Emma Galvin MD  Internal Medicine Resident, PGY-1  [address], [place].  1/8/2023, 6:04 PM  Attending Physician Statement  I have discussed the care of [address], including pertinent history and exam findings,  with the resident. I have seen and examined the patient and the key elements of all parts of the encounter have been performed by me. I agree with the assessment, plan and orders as documented by the resident with additions . Treatment plan Discussed with nursing staff in detail , all questions answered . Electronically signed by Dino Asencio MD on   1/8/23 at 10:18 PM EST    Please note that this chart was generated using voice recognition Dragon dictation software. Although every effort was made to ensure the accuracy of this automated transcription, some errors in transcription may have occurred.

## 2023-01-08 NOTE — PROGRESS NOTES
Physical Therapy  Facility/Department: [address] OBSERVATION  Physical Therapy Initial Assessment    Name: Verónica Leiva  : 1970  MRN: [id number]  Date of Service: 2023    Discharge Recommendations:  No therapy recommended at discharge   Pt demonstrates safe mobility in the hospital setting at this time. Patient Diagnosis(es): The encounter diagnosis was Multiple sclerosis exacerbation ([facility] [address] Past Medical History:  has a past medical history of Anxiety, Depression, Hypertension, Kidney stones, Multiple sclerosis ([address] and Osteoporosis. Past Surgical History:  has a past surgical history that includes Hysterectomy (); Tonsillectomy and adenoidectomy (); Cholecystectomy (); Appendectomy (); and Kidney stone surgery (). Assessment   Assessment: Pt admitted for MS exacerbation, demonstrates safe mobility/gait at this time, no need for skilled physical therapy. Decision Making: Low Complexity  History: MS        Plan   Physcial Therapy Plan  General Plan: Discharge with evaluation only  Safety Devices  Type of Devices: Call light within reach, Left in bed  Restraints  Restraints Initially in Place: No     Restrictions  Restrictions/Precautions  Required Braces or Orthoses?: No  Position Activity Restriction  Other position/activity restrictions: hx of MS, up with assist     Subjective   General  Patient assessed for rehabilitation services?: Yes  Additional Pertinent Hx: The patient is a 45 y.o. Non- / non  female who presents to the hospital for the management of MS flare. The patient reports a history of relapsing remitting possible sclerosis with the onset of . She has seen neurologists in the past but is currently in between providers as she had a recent insurance change. In the past she has tried Avonex, Rebif, Copaxone but was not able to tolerate any of these due to side effects. She is not currently on any disease modifying therapies.   She presents today with complaints of an MS flare that started right around [place] time. She has right-sided numbness to her arm and leg at baseline but reports this is worse than normal, as well as a feeling of neck pressure/pain starting in the back of her neck and radiating into her right arm. She also reports a headache and blurred vision with generalized weakness. She has had increased stress at home lately due to her dad's diagnosis of cancer. She did move in with him to help care for him. She does not use a cane or a walker at home but does admit to frequent falls. No recent imaging available for review in her chart. She reports typically getting an MS flare approximately every 6 months. She is typically treated with high-dose steroids with improvement in the symptoms. She denies any dysphagia or trouble breathing. 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 Feels overwhelmed at times, emotional support given.          Social/Functional History  Social/Functional History  Lives With: Parent, Son  Type of Home: House  Home Layout: One level  Home Access: Stairs to enter with rails  Entrance Stairs - Number of Steps: 2  Entrance Stairs - Rails: Both  Bathroom Shower/Tub: Tub/Shower unit  Bathroom Toilet: Standard  Bathroom Equipment: Grab bars in shower, Grab bars around toilet  ADL Assistance: [address]: Independent  Homemaking Responsibilities: Yes  Ambulation Assistance: Independent  Transfer Assistance: Independent  Active : Yes  Mode of Transportation: Car  Occupation: On disability  Additional Comments: 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  Vision/Hearing  Vision  Vision: Impaired  Vision Exceptions: Wears glasses for reading  Hearing  Hearing: Within functional limits    Cognition   Orientation  Overall Orientation Status: Within Functional Limits  Cognition  Overall Cognitive Status: WFL     Objective   Heart Rate: 68  BP: 104/65  BP Location: Left upper arm  MAP (Calculated): 78  Resp: 16  SpO2: 96 %  O2 Device: None (Room air)              AROM RLE (degrees)  RLE AROM: WFL  AROM LLE (degrees)  LLE AROM : WFL  Strength RLE  Comment: 4-/5 at Hip, 4/5 other joints  Strength LLE  Comment: 4/5  Strength Other  Other: Right side weakness. Sensation  Overall Sensation Status: Impaired  Additional Comments: Numbness and tingling on right side of her body. Worse with exacerbation. Bed mobility  Rolling to Left: Independent  Rolling to Right: Independent  Supine to Sit: Independent  Sit to Supine: Independent  Transfers  Sit to Stand: Independent  Stand to Sit: Independent  Ambulation  Surface: Level tile  Device: No Device  Assistance: Independent  Quality of Gait: No gait deviation noted, steady gait. Distance: 50 ft  Comments: Per pt, she feels slight weaker, but reports no fdifficulties for her self care/ADL and mobility .      Balance  Posture: Good  Sitting - Static: Good  Sitting - Dynamic: Good  Standing - Static: Good  Standing - Dynamic: Good;-     AM-PAC Score  AM-PAC Inpatient Mobility Raw Score : 24 (01/08/23 1401)  AM-PAC Inpatient T-Scale Score : 61.14 (01/08/23 1401)  Mobility Inpatient CMS 0-100% Score: 0 (01/08/23 1401)  Mobility Inpatient CMS G-Code Modifier : CH (01/08/23 1401)          Tinneti Score       Goals  Short Term Goals  Time Frame for Short Term Goals: NA  Patient Goals   Patient Goals : NA       Education  Patient Education  Education Given To: Patient  Education Provided: Role of Therapy  Education Method: Verbal  Barriers to Learning: None  Education Outcome: Verbalized understanding      Therapy Time   Individual Concurrent Group Co-treatment   Time In 1337         Time Out 1350         Minutes [address] [facility]

## 2023-01-09 ENCOUNTER — ANESTHESIA EVENT (OUTPATIENT)
Dept: OPERATING ROOM | Age: 53
DRG: 690 | End: 2023-01-09
Payer: COMMERCIAL

## 2023-01-09 ENCOUNTER — ANESTHESIA (OUTPATIENT)
Dept: OPERATING ROOM | Age: 53
DRG: 690 | End: 2023-01-09
Payer: COMMERCIAL

## 2023-01-09 VITALS
HEIGHT: 68 IN | TEMPERATURE: 98.1 F | OXYGEN SATURATION: 96 % | HEART RATE: 61 BPM | DIASTOLIC BLOOD PRESSURE: 84 MMHG | RESPIRATION RATE: 16 BRPM | BODY MASS INDEX: 31.22 KG/M2 | SYSTOLIC BLOOD PRESSURE: 115 MMHG | WEIGHT: 206 LBS

## 2023-01-09 PROBLEM — R20.2 PARESTHESIAS: Status: ACTIVE | Noted: 2023-01-09

## 2023-01-09 PROBLEM — R53.1 GENERALIZED WEAKNESS: Status: ACTIVE | Noted: 2023-01-09

## 2023-01-09 LAB
ABSOLUTE EOS #: <0.03 K/UL (ref 0–0.44)
ABSOLUTE IMMATURE GRANULOCYTE: 0.05 K/UL (ref 0–0.3)
ABSOLUTE LYMPH #: 4.03 K/UL (ref 1.1–3.7)
ABSOLUTE MONO #: 0.75 K/UL (ref 0.1–1.2)
ANION GAP SERPL CALCULATED.3IONS-SCNC: 10 MMOL/L (ref 9–17)
BASOPHILS # BLD: 0 % (ref 0–2)
BASOPHILS ABSOLUTE: 0.03 K/UL (ref 0–0.2)
BUN BLDV-MCNC: 20 MG/DL (ref 6–20)
CALCIUM SERPL-MCNC: 9.2 MG/DL (ref 8.6–10.4)
CHLORIDE BLD-SCNC: 104 MMOL/L (ref 98–107)
CO2: 25 MMOL/L (ref 20–31)
CREAT SERPL-MCNC: 0.67 MG/DL (ref 0.5–0.9)
EKG ATRIAL RATE: 75 BPM
EKG P AXIS: 15 DEGREES
EKG P-R INTERVAL: 144 MS
EKG Q-T INTERVAL: 390 MS
EKG QRS DURATION: 102 MS
EKG QTC CALCULATION (BAZETT): 435 MS
EKG R AXIS: -6 DEGREES
EKG T AXIS: 23 DEGREES
EKG VENTRICULAR RATE: 75 BPM
EOSINOPHILS RELATIVE PERCENT: 0 % (ref 1–4)
GFR SERPL CREATININE-BSD FRML MDRD: >60 ML/MIN/1.73M2
GLUCOSE BLD-MCNC: 122 MG/DL (ref 65–105)
GLUCOSE BLD-MCNC: 123 MG/DL (ref 65–105)
GLUCOSE BLD-MCNC: 147 MG/DL (ref 70–99)
GLUCOSE BLD-MCNC: 164 MG/DL (ref 65–105)
HCT VFR BLD CALC: 37.6 % (ref 36.3–47.1)
HEMOGLOBIN: 12.5 G/DL (ref 11.9–15.1)
IMMATURE GRANULOCYTES: 0 %
LYMPHOCYTES # BLD: 36 % (ref 24–43)
MCH RBC QN AUTO: 30.3 PG (ref 25.2–33.5)
MCHC RBC AUTO-ENTMCNC: 33.2 G/DL (ref 28.4–34.8)
MCV RBC AUTO: 91.3 FL (ref 82.6–102.9)
MONOCYTES # BLD: 7 % (ref 3–12)
NRBC AUTOMATED: 0 PER 100 WBC
PDW BLD-RTO: 11.9 % (ref 11.8–14.4)
PLATELET # BLD: 198 K/UL (ref 138–453)
PMV BLD AUTO: 11 FL (ref 8.1–13.5)
POTASSIUM SERPL-SCNC: 4.1 MMOL/L (ref 3.7–5.3)
RBC # BLD: 4.12 M/UL (ref 3.95–5.11)
SEG NEUTROPHILS: 57 % (ref 36–65)
SEGMENTED NEUTROPHILS ABSOLUTE COUNT: 6.25 K/UL (ref 1.5–8.1)
SODIUM BLD-SCNC: 139 MMOL/L (ref 135–144)
WBC # BLD: 11.1 K/UL (ref 3.5–11.3)

## 2023-01-09 PROCEDURE — 6360000002 HC RX W HCPCS: Performed by: INTERNAL MEDICINE

## 2023-01-09 PROCEDURE — 43235 EGD DIAGNOSTIC BRUSH WASH: CPT | Performed by: INTERNAL MEDICINE

## 2023-01-09 PROCEDURE — 85025 COMPLETE CBC W/AUTO DIFF WBC: CPT

## 2023-01-09 PROCEDURE — 6360000002 HC RX W HCPCS: Performed by: STUDENT IN AN ORGANIZED HEALTH CARE EDUCATION/TRAINING PROGRAM

## 2023-01-09 PROCEDURE — 99231 SBSQ HOSP IP/OBS SF/LOW 25: CPT | Performed by: INTERNAL MEDICINE

## 2023-01-09 PROCEDURE — 7100000011 HC PHASE II RECOVERY - ADDTL 15 MIN: Performed by: INTERNAL MEDICINE

## 2023-01-09 PROCEDURE — 6370000000 HC RX 637 (ALT 250 FOR IP): Performed by: INTERNAL MEDICINE

## 2023-01-09 PROCEDURE — 7100000010 HC PHASE II RECOVERY - FIRST 15 MIN: Performed by: INTERNAL MEDICINE

## 2023-01-09 PROCEDURE — 82947 ASSAY GLUCOSE BLOOD QUANT: CPT

## 2023-01-09 PROCEDURE — 2500000003 HC RX 250 WO HCPCS

## 2023-01-09 PROCEDURE — 3609017100 HC EGD: Performed by: INTERNAL MEDICINE

## 2023-01-09 PROCEDURE — 3700000000 HC ANESTHESIA ATTENDED CARE: Performed by: INTERNAL MEDICINE

## 2023-01-09 PROCEDURE — 36415 COLL VENOUS BLD VENIPUNCTURE: CPT

## 2023-01-09 PROCEDURE — 6360000002 HC RX W HCPCS

## 2023-01-09 PROCEDURE — 0DJ08ZZ INSPECTION OF UPPER INTESTINAL TRACT, VIA NATURAL OR ARTIFICIAL OPENING ENDOSCOPIC: ICD-10-PCS | Performed by: INTERNAL MEDICINE

## 2023-01-09 PROCEDURE — 99239 HOSP IP/OBS DSCHRG MGMT >30: CPT | Performed by: NURSE PRACTITIONER

## 2023-01-09 PROCEDURE — 2580000003 HC RX 258

## 2023-01-09 PROCEDURE — 80048 BASIC METABOLIC PNL TOTAL CA: CPT

## 2023-01-09 RX ORDER — ATORVASTATIN CALCIUM 20 MG/1
20 TABLET, FILM COATED ORAL NIGHTLY
Qty: 30 TABLET | Refills: 3 | Status: SHIPPED | OUTPATIENT
Start: 2023-01-09

## 2023-01-09 RX ORDER — LIDOCAINE HYDROCHLORIDE 10 MG/ML
INJECTION, SOLUTION EPIDURAL; INFILTRATION; INTRACAUDAL; PERINEURAL PRN
Status: DISCONTINUED | OUTPATIENT
Start: 2023-01-09 | End: 2023-01-09 | Stop reason: SDUPTHER

## 2023-01-09 RX ORDER — SODIUM CHLORIDE, SODIUM LACTATE, POTASSIUM CHLORIDE, CALCIUM CHLORIDE 600; 310; 30; 20 MG/100ML; MG/100ML; MG/100ML; MG/100ML
INJECTION, SOLUTION INTRAVENOUS CONTINUOUS PRN
Status: DISCONTINUED | OUTPATIENT
Start: 2023-01-09 | End: 2023-01-09 | Stop reason: SDUPTHER

## 2023-01-09 RX ORDER — BUTALBITAL, ACETAMINOPHEN AND CAFFEINE 50; 325; 40 MG/1; MG/1; MG/1
1 TABLET ORAL EVERY 6 HOURS PRN
Qty: 30 TABLET | Refills: 1 | Status: SHIPPED | OUTPATIENT
Start: 2023-01-09

## 2023-01-09 RX ORDER — SODIUM CHLORIDE 0.9 % (FLUSH) 0.9 %
5-40 SYRINGE (ML) INJECTION PRN
Status: DISCONTINUED | OUTPATIENT
Start: 2023-01-09 | End: 2023-01-09 | Stop reason: HOSPADM

## 2023-01-09 RX ORDER — HYDRALAZINE HYDROCHLORIDE 20 MG/ML
10 INJECTION INTRAMUSCULAR; INTRAVENOUS
Status: DISCONTINUED | OUTPATIENT
Start: 2023-01-09 | End: 2023-01-09 | Stop reason: HOSPADM

## 2023-01-09 RX ORDER — AMITRIPTYLINE HYDROCHLORIDE 25 MG/1
12.5 TABLET, FILM COATED ORAL NIGHTLY
Qty: 30 TABLET | Refills: 3 | Status: SHIPPED | OUTPATIENT
Start: 2023-01-09

## 2023-01-09 RX ORDER — CEPHALEXIN 500 MG/1
500 CAPSULE ORAL 2 TIMES DAILY
Qty: 10 CAPSULE | Refills: 0 | Status: SHIPPED | OUTPATIENT
Start: 2023-01-09 | End: 2023-01-14

## 2023-01-09 RX ORDER — SODIUM CHLORIDE 9 MG/ML
INJECTION, SOLUTION INTRAVENOUS PRN
Status: DISCONTINUED | OUTPATIENT
Start: 2023-01-09 | End: 2023-01-09 | Stop reason: HOSPADM

## 2023-01-09 RX ORDER — SODIUM CHLORIDE 0.9 % (FLUSH) 0.9 %
5-40 SYRINGE (ML) INJECTION EVERY 12 HOURS SCHEDULED
Status: DISCONTINUED | OUTPATIENT
Start: 2023-01-09 | End: 2023-01-09 | Stop reason: HOSPADM

## 2023-01-09 RX ORDER — ONDANSETRON 2 MG/ML
4 INJECTION INTRAMUSCULAR; INTRAVENOUS
Status: DISCONTINUED | OUTPATIENT
Start: 2023-01-09 | End: 2023-01-09 | Stop reason: HOSPADM

## 2023-01-09 RX ORDER — SPIRONOLACTONE 25 MG/1
25 TABLET ORAL DAILY
Qty: 30 TABLET | Refills: 3 | Status: SHIPPED | OUTPATIENT
Start: 2023-01-10

## 2023-01-09 RX ORDER — PROPOFOL 10 MG/ML
INJECTION, EMULSION INTRAVENOUS PRN
Status: DISCONTINUED | OUTPATIENT
Start: 2023-01-09 | End: 2023-01-09 | Stop reason: SDUPTHER

## 2023-01-09 RX ADMIN — ESCITALOPRAM OXALATE 20 MG: 10 TABLET ORAL at 12:23

## 2023-01-09 RX ADMIN — PROPOFOL 150 MG: 10 INJECTION, EMULSION INTRAVENOUS at 09:49

## 2023-01-09 RX ADMIN — DEXTROAMPHETAMINE SACCHARATE, AMPHETAMINE ASPARTATE, DEXTROAMPHETAMINE SULFATE, AMPHETAMINE SULFATE TABLETS, 10 MG,CLL 10 MG: 2.5; 2.5; 2.5; 2.5 TABLET ORAL at 12:23

## 2023-01-09 RX ADMIN — HYDROMORPHONE HYDROCHLORIDE 0.5 MG: 1 INJECTION, SOLUTION INTRAMUSCULAR; INTRAVENOUS; SUBCUTANEOUS at 10:22

## 2023-01-09 RX ADMIN — SODIUM CHLORIDE, POTASSIUM CHLORIDE, SODIUM LACTATE AND CALCIUM CHLORIDE: 600; 310; 30; 20 INJECTION, SOLUTION INTRAVENOUS at 09:45

## 2023-01-09 RX ADMIN — SPIRONOLACTONE 25 MG: 25 TABLET ORAL at 12:24

## 2023-01-09 RX ADMIN — CEFTRIAXONE SODIUM 1000 MG: 10 INJECTION, POWDER, FOR SOLUTION INTRAVENOUS at 02:36

## 2023-01-09 RX ADMIN — LIDOCAINE HYDROCHLORIDE 50 MG: 10 INJECTION, SOLUTION EPIDURAL; INFILTRATION; INTRACAUDAL; PERINEURAL at 09:49

## 2023-01-09 RX ADMIN — ENOXAPARIN SODIUM 40 MG: 100 INJECTION SUBCUTANEOUS at 12:17

## 2023-01-09 ASSESSMENT — PAIN DESCRIPTION - ONSET: ONSET: ON-GOING

## 2023-01-09 ASSESSMENT — PAIN DESCRIPTION - DESCRIPTORS
DESCRIPTORS: ACHING;DULL
DESCRIPTORS: ACHING

## 2023-01-09 ASSESSMENT — PAIN DESCRIPTION - FREQUENCY: FREQUENCY: CONTINUOUS

## 2023-01-09 ASSESSMENT — PAIN SCALES - GENERAL
PAINLEVEL_OUTOF10: 3
PAINLEVEL_OUTOF10: 6

## 2023-01-09 ASSESSMENT — PAIN DESCRIPTION - ORIENTATION: ORIENTATION: POSTERIOR

## 2023-01-09 ASSESSMENT — PAIN DESCRIPTION - LOCATION
LOCATION: HEAD
LOCATION: ABDOMEN

## 2023-01-09 ASSESSMENT — PAIN DESCRIPTION - PAIN TYPE: TYPE: ACUTE PAIN

## 2023-01-09 ASSESSMENT — PAIN DESCRIPTION - DIRECTION: RADIATING_TOWARDS: TOP OF HEAD

## 2023-01-09 ASSESSMENT — PAIN - FUNCTIONAL ASSESSMENT: PAIN_FUNCTIONAL_ASSESSMENT: ACTIVITIES ARE NOT PREVENTED

## 2023-01-09 NOTE — DISCHARGE SUMMARY
[address] [facility] Neurology  [address] [facility]    Discharge Summary     Patient ID: Jessica Zamorano  :  1970   MRN: [id number]     ACCOUNT:  [de-identified]   Patient's PCP: Maureen Womack PA-C  Admit Date: 2023   Discharge Date: 2023     Length of Stay: 3  Code Status:  Full Code  Admitting Physician: Halle Coronel MD  Discharge Physician: MALCOLM Wei CNP     Active Discharge Diagnoses:     Hospital Problem Lists:  Principal Problem:    Multiple sclerosis ([address]  Active Problems:    Acute intractable tension-type headache    Type 2 diabetes mellitus, with long-term current use of insulin (HCC)    UTI (urinary tract infection)    Bandemia    Paresthesias    Generalized weakness    HTN (hypertension)  Resolved Problems:    * No resolved hospital problems. *      Admission Condition:  stable     Discharged Condition: stable    Hospital Stay:     Hospital Course: Jessica Zamorano is a 45 y.o. female who was admitted for the management of MS symptoms. The patient reports a history of relapsing remitting possible sclerosis with the onset of . She has seen neurologists in the past but is currently in between providers as she had a recent insurance change. In the past she has tried Avonex, Rebif, Copaxone but was not able to tolerate any of these due to side effects. She is not currently on any disease modifying therapies. She presents today with complaints of an MS flare that started right around Marty time. She has right-sided numbness to her arm and leg at baseline but reports this is worse than normal, as well as a feeling of neck pressure/pain starting in the back of her neck and radiating into her right arm. She also reports a headache and blurred vision with generalized weakness. She has had increased stress at home lately due to her dad's diagnosis of cancer. She did move in with him to help care for him.   She does not use a cane or a walker at home but does admit to frequent falls. No recent imaging available for review in her chart. She reports typically getting an MS flare approximately every 6 months. She is typically treated with high-dose steroids with improvement in the symptoms. She denies any dysphagia or trouble breathing. MRI brain and cervical spine were negative for any evidence of an active lesion. She was found to have a UTI and was started on antibiotics with improvement in her MS symptoms. Due to ongoing epigastric pain she requested an evaluation by the GI team who did see her and performed an EGD which was normal.  Internal medicine also evaluated her due to elevated blood sugars with an A1c of 6.7. They recommended starting metformin on discharge. She was started on Elavil for her headaches. On day of discharge she is alert, oriented, and overall feeling much improved. She is agreeable to discharge. She will need outpatient follow-up with a new PCP, and needs to get established with a new neurologist.      Significant therapeutic interventions: Started on Elavil and metformin. Will need to complete antibiotics for UTI.     Significant Diagnostic Studies:   Labs / Micro:  CBC:   Lab Results   Component Value Date/Time    WBC 11.1 01/09/2023 05:47 AM    RBC 4.12 01/09/2023 05:47 AM    HGB 12.5 01/09/2023 05:47 AM    HCT 37.6 01/09/2023 05:47 AM    MCV 91.3 01/09/2023 05:47 AM    MCH 30.3 01/09/2023 05:47 AM    MCHC 33.2 01/09/2023 05:47 AM    RDW 11.9 01/09/2023 05:47 AM     01/09/2023 05:47 AM     BMP:    Lab Results   Component Value Date/Time    GLUCOSE 147 01/09/2023 05:47 AM     01/09/2023 05:47 AM    K 4.1 01/09/2023 05:47 AM     01/09/2023 05:47 AM    CO2 25 01/09/2023 05:47 AM    ANIONGAP 10 01/09/2023 05:47 AM    BUN 20 01/09/2023 05:47 AM    CREATININE 0.67 01/09/2023 05:47 AM    BUNCRER NOT REPORTED 01/18/2022 09:20 PM    CALCIUM 9.2 01/09/2023 05:47 AM    LABGLOM >60 01/09/2023 05:47 AM GFRAA >60 01/18/2022 09:20 PM    GFR      01/18/2022 09:20 PM    GFR NOT REPORTED 01/18/2022 09:20 PM     U/A:    Lab Results   Component Value Date/Time    COLORU Yellow 01/07/2023 12:29 AM    TURBIDITY Cloudy 01/07/2023 12:29 AM    SPECGRAV 1.023 01/07/2023 12:29 AM    HGBUR NEGATIVE 01/07/2023 12:29 AM    PHUR 5.0 01/07/2023 12:29 AM    PROTEINU NEGATIVE 01/07/2023 12:29 AM    GLUCOSEU NEGATIVE 01/07/2023 12:29 AM    KETUA NEGATIVE 01/07/2023 12:29 AM    BILIRUBINUR NEGATIVE 01/07/2023 12:29 AM    UROBILINOGEN Normal 01/07/2023 12:29 AM    NITRU NEGATIVE 01/07/2023 12:29 AM    LEUKOCYTESUR MODERATE 01/07/2023 12:29 AM         Radiology:    MRI CERVICAL SPINE W WO CONTRAST    Result Date: 1/7/2023  EXAMINATION: MRI OF THE CERVICAL SPINE WITHOUT AND WITH CONTRAST  1/7/2023 8:24 am: TECHNIQUE: Multiplanar multisequence MRI of the cervical spine was performed without and with the administration of intravenous contrast. COMPARISON: None available at time of dictation. HISTORY: ORDERING SYSTEM PROVIDED HISTORY: concern of MS flare, reported history of MS TECHNOLOGIST PROVIDED HISTORY: concern of MS flare, reported history of MS Decision Support Exception - unselect if not a suspected or confirmed emergency medical condition->Emergency Medical Condition (MA) Reason for Exam: concern of MS flare, reported history of MS FINDINGS: BONES/ALIGNMENT: Straightening of the cervical spine. No significant spondylolisthesis. Vertebral body heights appear preserved. Marrow signal appears within normal limits. No evidence of acute fracture or aggressive appearing osseous lesions. SPINAL CORD: No convincing abnormal spinal cord signal or enhancement appreciated. SOFT TISSUES: No abnormal enhancement of the cervical spine. No paraspinal mass identified. C2-C3: No significant spinal canal stenosis or foraminal narrowing. C3-C4: Uncovertebral hypertrophy. No significant spinal canal stenosis. Mild left foraminal narrowing. C4-C5: No significant spinal canal stenosis or foraminal narrowing. C5-C6: Disc osteophyte complex eccentric to the right. No significant spinal stenosis. Mild right foraminal narrowing. C6-C7: No significant spinal canal stenosis or foraminal narrowing. C7-T1: No significant spinal canal stenosis or foraminal narrowing. 1. No convincing abnormal spinal cord signal or enhancement appreciated. 2. Mild multilevel cervical spondylosis. XR CHEST PORTABLE    Result Date: 1/6/2023  EXAMINATION: ONE XRAY VIEW OF THE CHEST 1/6/2023 1:05 pm COMPARISON: 01/18/2022 HISTORY: ORDERING SYSTEM PROVIDED HISTORY: sob TECHNOLOGIST PROVIDED HISTORY: sob Reason for Exam: SOB port upr at 1pm FINDINGS: Cardiomediastinal silhouette appears within normal limits. No focal consolidation or overt pulmonary edema. Costophrenic angles are sharp. No evidence of pneumothorax. No acute osseous abnormalities. No radiographic evidence of an acute cardiopulmonary process. MRI BRAIN W WO CONTRAST    Result Date: 1/7/2023  EXAMINATION: MRI OF THE BRAIN WITHOUT AND WITH CONTRAST  1/7/2023 8:24 am TECHNIQUE: Multiplanar multisequence MRI of the head/brain was performed without and with the administration of intravenous contrast. COMPARISON: None available at time of dictation. HISTORY: ORDERING SYSTEM PROVIDED HISTORY: concern for MS flare TECHNOLOGIST PROVIDED HISTORY: concern for MS flare Decision Support Exception - unselect if not a suspected or confirmed emergency medical condition->Emergency Medical Condition (MA) Reason for Exam: concern for MS flare FINDINGS: INTRACRANIAL STRUCTURES/VENTRICLES: Multiple (greater than 5) scattered supratentorial T2/FLAIR hyperintensities present oriented perpendicular to the lateral ventricles compatible with history of multiple sclerosis. No associated diffusion restriction or contrast enhancement appreciated. There is no acute infarct. No mass effect or midline shift.  No evidence of an acute intracranial hemorrhage. The ventricles and sulci are normal in size and configuration. The sellar/suprasellar regions appear unremarkable. The normal signal voids within the major intracranial vessels appear maintained. No abnormal focus of enhancement is seen within the brain. ORBITS: The visualized portion of the orbits demonstrate no acute abnormality. SINUSES: Right maxillary sinus retention cyst.  The mastoid air cells appear clear. No associated diffusion BONES/SOFT TISSUES: The bone marrow signal intensity appears normal. The soft tissues demonstrate no acute abnormality. Multiple (greater than 5) scattered T2/FLAIR hyperintensities present with a distribution compatible with history of multiple sclerosis. No prior imaging available for comparison at time of dictation. No associated diffusion restriction or contrast enhancement appreciated to suggest active disease. Consultations:    Consults:     Final Specialist Recommendations/Findings:   IP CONSULT TO NEUROLOGY  IP CONSULT TO GI  IP CONSULT TO INTERNAL MEDICINE      The patient was seen and examined on day of discharge and this discharge summary is in conjunction with any daily progress note from day of discharge. Discharge plan:     Disposition: Home    Physician Follow Up:   Due to insurance changes, patient needs to become established with a PCP and neurology office. Requiring Further Evaluation/Follow Up POST HOSPITALIZATION/Incidental Findings: Complete antibiotics as listed below. Diet: regular diet    Activity: As tolerated    Instructions to Patient: Make follow-up appointments with a family doctor and neurology office.     Discharge Medications:      Medication List        START taking these medications      amitriptyline 25 MG tablet  Commonly known as: ELAVIL  Take 0.5 tablets by mouth nightly     atorvastatin 20 MG tablet  Commonly known as: LIPITOR  Take 1 tablet by mouth nightly butalbital-acetaminophen-caffeine -40 MG per tablet  Commonly known as: FIORICET, ESGIC  Take 1 tablet by mouth every 6 hours as needed for Headaches     cephALEXin 500 MG capsule  Commonly known as: KEFLEX  Take 1 capsule by mouth 2 times daily for 5 days     metFORMIN 500 MG tablet  Commonly known as: GLUCOPHAGE  Take 1 tablet by mouth 2 times daily (with meals)     spironolactone 25 MG tablet  Commonly known as: ALDACTONE  Take 1 tablet by mouth daily  Start taking on: January 10, 2023            CONTINUE taking these medications      acetaminophen 500 MG tablet  Commonly known as: TYLENOL  Take 1 tablet by mouth 4 times daily as needed for Pain     amLODIPine 10 MG tablet  Commonly known as: NORVASC     amphetamine-dextroamphetamine 10 MG tablet  Commonly known as: ADDERALL     escitalopram 10 MG tablet  Commonly known as: LEXAPRO     ondansetron 4 MG disintegrating tablet  Commonly known as: Zofran ODT  Take 1 tablet by mouth every 8 hours as needed for Nausea            STOP taking these medications      lisinopril 20 MG tablet  Commonly known as: PRINIVIL;ZESTRIL            ASK your doctor about these medications      lidocaine 5 %  Commonly known as: Lidoderm  Place 1 patch onto the skin daily 12 hours on, 12 hours off.      Multivitamin Adult Chew  Take 1 tablet by mouth daily     vitamin D 1000 UNIT Tabs tablet  Commonly known as: CHOLECALCIFEROL               Where to Get Your Medications        These medications were sent to [facility] [address] [facility], [address] [place] [address], [place] [address]      Phone: [phone]   amitriptyline 25 MG tablet  atorvastatin 20 MG tablet  butalbital-acetaminophen-caffeine -40 MG per tablet  cephALEXin 500 MG capsule  metFORMIN 500 MG tablet  spironolactone 25 MG tablet         Time Spent on discharge is  32 minutes in patient examination, evaluation, counseling as well as medication reconciliation, prescriptions for required medications, discharge plan and follow up. Electronically signed by   MALCOLM Price CNP  1/9/2023  3:20 PM      Thank you Dr. Stanton Trotter PA-C for the opportunity to be involved in this patient's care.

## 2023-01-09 NOTE — OP NOTE
EGD      Patient:   Chilton Dancer    :    1970    Facility:   [facility]   Referring/PCP: Emmanuel Lorenzo PA-C    Procedure:   Esophagogastroduodenoscopy --diagnostic  Date:     2023   Endoscopist:  Caleb Knutson MD, [facility]    Indication:    Epigastric pain    Postprocedure diagnosis:   Irregular Z line    Anesthesia:  MAC    Complications: None    EBL: None from the procedure    Specimen: None    Description of Procedure:  Informed consent was obtained from the patient after explanation of the procedure including indications, description of the procedure,  benefits and possible risks and complications of the procedure, and alternatives. Questions were answered. The patient's history was reviewed and a directed physical examination was performed prior to the procedure. Patient was monitored throughout the procedure with pulse oximetry and periodic assessment of vital signs. Patient was sedated as noted above. With the patient in the left lateral decubitus position, the Olympus videoendoscope was placed in the patient's mouth and under direct visualization passed into the esophagus. Visualization of the esophagus, stomach, and duodenum was performed during both introduction and withdrawal of the endoscope and retroflexed view of the proximal stomach was obtained. The scope was passed to the 2nd portion of the duodenum. The patient tolerated the procedure well and was taken to the recovery area in good condition. Findings[de-identified]   Esophagus: Irregular Z line, Esophagus otherwise normal.   Stomach: Normal.   Duodenum: normal    Recommendations: -Acid suppression with a proton pump inhibitor daily. If symptoms persists f/u in GI office. Will sign off. Please do not hesitate to call with any questions. Thank you for allowing us to participate in the care of your patient.     Electronically signed by Caleb Knutson MD, FACG  on 2023 at 9:57 AM

## 2023-01-09 NOTE — DISCHARGE INSTRUCTIONS
After clarifying with your new insurance, establish with a PCP and neurology office for further follow-up and medication management.

## 2023-01-09 NOTE — FLOWSHEET NOTE
Discharge instructions reviewed with patient,patient verbalizes understanding. Prescription meds delivered to bedside. Patient discharged with meds and copy of discharge paperwork per ambulatory with steady gait.

## 2023-01-09 NOTE — PROGRESS NOTES
[facility]  Internal Medicine Teaching Residency Program  Inpatient Daily Progress Note  ______________________________________________________________________________    Patient: Irina Malhotra  YOB: 1970   IRU:[id number]    Acct: [de-identified]     Room: 1/18-0  Admit date: 1/6/2023  Today's date: 01/09/23  Number of days in the hospital: 3    SUBJECTIVE   Admitting Diagnosis: Multiple sclerosis ([address]  CC: Multiple sclerosis exacerbation  Pt examined at bedside. Chart & results reviewed. - No acute events overnight.  - Hemodynamically stable, afebrile and maintaining oxygen saturation on room air.  - No active symptoms.  - Blood sugars 147 this morning after starting Lantus 10 units and medium dose sliding scale yesterday. - Patient is planned for EGD this morning by GI.    ROS:  Constitutional:  negative for chills, fevers, sweats  Respiratory:  negative for cough, dyspnea on exertion, hemoptysis, shortness of breath, wheezing  Cardiovascular:  negative for chest pain, chest pressure/discomfort, lower extremity edema, palpitations  Gastrointestinal:  negative for abdominal pain, constipation, diarrhea, nausea, vomiting  Neurological:  negative for dizziness, headache  BRIEF HISTORY     The patient is a pleasant 45 y.o. female with history of multiple sclerosis, HTN, osteoporosis, kidney stones and anxiety was admitted with neurology on 01/06/2023 for management of MS flare. She has a history of relapsing remitting multiple sclerosis since 2001 and has been under treatment per neurology in the past.  Patient states that currently she is in between switching neurologist and has an upcoming appointment in February with a new neurologist.  She has not been on any disease modifying therapy lately. She has been experiencing symptoms of blurred vision and right upper extremity numbness for the last 2 weeks.   He has been started on tizanidine with good response. She was also found to have urinary tract infection and started on IV Rocephin blood cultures still pending. Internal medicine has been consulted because of ongoing hyperglycemia and elevated HbA1c with concern of type 2 diabetes mellitus requiring management. The patient she previously had issues of uncontrolled blood sugars every time she used steroids. She does mentions that she was seen by endocrinology outpatient but never diagnosed with diabetes. She complains of urinary frequency and epigastric discomfort. Currently on treatment with IV Rocephin for UTI. Ongoing GI work-up for dysphagia/dyspepsia. OBJECTIVE     Vital Signs:  /76   Pulse 59   Temp 97 °F (36.1 °C)   Resp 14   Ht 5' 8\" (1.727 m)   Wt 206 lb (93.4 kg)   SpO2 97%   BMI 31.32 kg/m²     Temp (24hrs), Av.4 °F (36.3 °C), Min:96.8 °F (36 °C), Max:98.1 °F (36.7 °C)    In: 150   Out: -     Physical Exam:  Constitutional: This is a well developed, well nourished,  45y.o. year old female who is alert, oriented, cooperative and in no apparent distress. Head:normocephalic and atraumatic. EENT:  PERRLA. No conjunctival injections. Septum was midline, mucosa was without erythema, exudates or cobblestoning. No thrush was noted. Neck: Supple without thyromegaly. No elevated JVP. Trachea was midline. Respiratory: Chest was symmetrical without dullness to percussion. Breath sounds bilaterally were clear to auscultation. There were no wheezes, rhonchi or rales. There is no intercostal retraction or use of accessory muscles. No egophony noted. Cardiovascular: Regular without murmur, clicks, gallops or rubs. Abdomen: Slightly rounded and soft without organomegaly. No rebound, rigidity or guarding was appreciated. Lymphatic: No lymphadenopathy. Musculoskeletal: Normal curvature of the spine. No gross muscle weakness. Extremities:  No lower extremity edema, ulcerations, tenderness, varicosities or erythema. Muscle size, tone and strength are normal.  No involuntary movements are noted. Skin:  Warm and dry. Good color, turgor and pigmentation. No lesions or scars.   No cyanosis or clubbing  Neurological/Psychiatric: The patient's general behavior, level of consciousness, thought content and emotional status is normal.        Medications:  Scheduled Medications:    [MAR Hold] insulin lispro  0-8 Units SubCUTAneous TID WC    [MAR Hold] insulin lispro  0-4 Units SubCUTAneous Nightly    [MAR Hold] insulin glargine  10 Units SubCUTAneous Nightly    [MAR Hold] cefTRIAXone (ROCEPHIN) IV  1,000 mg IntraVENous Q24H    [MAR Hold] pantoprazole  40 mg Oral QAM AC    [MAR Hold] amitriptyline  12.5 mg Oral Nightly    [MAR Hold] sodium chloride flush  5-40 mL IntraVENous 2 times per day    [MAR Hold] enoxaparin  40 mg SubCUTAneous Daily    [MAR Hold] amLODIPine  10 mg Oral Daily    [MAR Hold] amphetamine-dextroamphetamine  10 mg Oral BID AC    [MAR Hold] escitalopram  20 mg Oral Daily    [MAR Hold] spironolactone  25 mg Oral Daily    [MAR Hold] atorvastatin  20 mg Oral Nightly    [MAR Hold] tiZANidine  2 mg Oral Nightly     Continuous Infusions:    [MAR Hold] dextrose      [MAR Hold] sodium chloride       PRN MedicationsHYDROmorphone, 0.5 mg, Q5 Min PRN  [MAR Hold] butalbital-acetaminophen-caffeine, 1 tablet, Q6H PRN  [MAR Hold] glucose, 4 tablet, PRN  [MAR Hold] dextrose bolus, 125 mL, PRN   Or  [MAR Hold] dextrose bolus, 250 mL, PRN  [MAR Hold] glucagon (rDNA), 1 mg, PRN  [MAR Hold] dextrose, , Continuous PRN  [MAR Hold] sodium chloride flush, 10 mL, PRN  [MAR Hold] sodium chloride flush, 5-40 mL, PRN  [MAR Hold] sodium chloride, , PRN  [MAR Hold] ondansetron, 4 mg, Q8H PRN   Or  [MAR Hold] ondansetron, 4 mg, Q6H PRN  [MAR Hold] polyethylene glycol, 17 g, Daily PRN  [MAR Hold] acetaminophen, 650 mg, Q6H PRN   Or  [MAR Hold] acetaminophen, 650 mg, Q6H PRN        Diagnostic Labs:  CBC:   Recent Labs     01/07/23  0425 01/08/23  0600 01/09/23  0547   WBC 5.1 13.3* 11.1   RBC 4.26 3.91* 4.12   HGB 12.8 11.9 12.5   HCT 39.5 35.1* 37.6   MCV 92.7 89.8 91.3   RDW 11.8 11.8 11.9    204 198     BMP:   Recent Labs     01/07/23  0425 01/08/23  0600 01/09/23  0547   * 135 139   K 4.5 4.6 4.1   CL 98 103 104   CO2 17* 24 25   BUN 20 20 20   CREATININE 0.95* 0.71 0.67     BNP: No results for input(s): BNP in the last 72 hours. PT/INR: No results for input(s): PROTIME, INR in the last 72 hours. APTT: No results for input(s): APTT in the last 72 hours. CARDIAC ENZYMES: No results for input(s): CKMB, CKMBINDEX, TROPONINI in the last 72 hours. Invalid input(s): CKTOTAL;3  FASTING LIPID PANEL:No results found for: CHOL, HDL, TRIG  LIVER PROFILE: No results for input(s): AST, ALT, ALB, BILIDIR, BILITOT, ALKPHOS in the last 72 hours. MICROBIOLOGY:   Lab Results   Component Value Date/Time    CULTURE ESCHERICHIA COLI >[id number] CFU/ML (A) 01/06/2023 06:00 PM       Imaging:    MRI CERVICAL SPINE W WO CONTRAST    Result Date: 1/7/2023  1. No convincing abnormal spinal cord signal or enhancement appreciated. 2. Mild multilevel cervical spondylosis. XR CHEST PORTABLE    Result Date: 1/6/2023  No radiographic evidence of an acute cardiopulmonary process. MRI BRAIN W WO CONTRAST    Result Date: 1/7/2023  Multiple (greater than 5) scattered T2/FLAIR hyperintensities present with a distribution compatible with history of multiple sclerosis. No prior imaging available for comparison at time of dictation. No associated diffusion restriction or contrast enhancement appreciated to suggest active disease. ASSESSMENT & PLAN     ASSESSMENT / PLAN:     Thank you for allowing us to see Lucinda Tim in consultation for diabetes mellitus management. Type 2 diabetes mellitus: HbA1c 6.7. Persistently hyperglycemic on this admission.   Previously was discharged on 10 units Lantus daily from [facility] with outpatient follow-up in endocrinology clinic. Blood sugars 147 this morning. We will continue insulin Lantus 10 units at night and also keep medium dose sliding scale along with bedtime short-acting insulin. Recommend POC glucose checks and hypoglycemia protocol. Also recommend encouraging diabetic diet. We will plan for switching to metformin on discharge. Multiple sclerosis exacerbation and migraine headaches: Management as per primary team  UTI: On IV Rocephin for 5 days. Dyspepsia/GERD: GI consulted, plan for EGD this morning  Essential hypertension: On amlodipine 10 mg daily and spironolactone 25 mg daily  Hyperlipidemia: On atorvastatin 20 mg daily  Anxiety/depression: Continue escitalopram and amitriptyline        Treatment plan discussed with the patient and agrees. Kindly reach out if you have any further queries regarding patient's management. Maryland Meigs, MD  Internal Medicine Resident, PGY-1  [facility], [facility].  1/9/2023, 11:06 AM  Attending Physician Statement  I have discussed the care of [facility] [address], including pertinent history and exam findings,  with the resident. I have seen and examined the patient and the key elements of all parts of the encounter have been performed by me. I agree with the assessment, plan and orders as documented by the resident with additions . Treatment plan Discussed with nursing staff in detail , all questions answered . Electronically signed by Dejan Jarquin MD on   1/9/23 at 11:40 AM EST    Please note that this chart was generated using voice recognition Dragon dictation software. Although every effort was made to ensure the accuracy of this automated transcription, some errors in transcription may have occurred.

## 2023-01-09 NOTE — PROGRESS NOTES
CLINICAL PHARMACY NOTE: MEDS TO BEDS    Total # of Prescriptions Filled: 4   The following medications were delivered to the patient:  AMITRIPTYLINE 25  KEFLEX 500  FIORICET   METFORMIN 500    Additional Documentation:

## 2023-01-09 NOTE — ANESTHESIA POSTPROCEDURE EVALUATION
Department of Anesthesiology  Postprocedure Note    Patient: Felicita Lizama  MRN: [id number]  YOB: 1970  Date of evaluation: 1/9/2023      Procedure Summary     Date: 01/09/23 Room / Location: [address]    Anesthesia Start: 0945 Anesthesia Stop: 1004    Procedure: EGD ESOPHAGOGASTRODUODENOSCOPY Diagnosis:       Dysphagia, unspecified type      Epigastric abdominal pain      (Dysphagia, unspecified type [R13.10])      (Epigastric abdominal pain [R10.13])    Surgeons: Nicola Hatfield MD Responsible Provider: Alin Benavides MD    Anesthesia Type: MAC ASA Status: 3          Anesthesia Type: No value filed.     Camron Phase I: Camron Score: 8    Camron Phase II:        Anesthesia Post Evaluation    Patient location during evaluation: bedside  Patient participation: complete - patient participated  Level of consciousness: awake  Airway patency: patent  Nausea & Vomiting: no nausea and no vomiting  Complications: no  Cardiovascular status: blood pressure returned to baseline  Respiratory status: acceptable  Hydration status: euvolemic  Comments: /78   Pulse 59   Temp 97 °F (36.1 °C)   Resp 14   Ht 5' 8\" (1.727 m)   Wt 206 lb (93.4 kg)   SpO2 97%   BMI 31.32 kg/m²

## 2023-01-09 NOTE — ANESTHESIA PRE PROCEDURE
Department of Anesthesiology  Preprocedure Note       Name:  Felicita Lizama   Age:  45 y.o.  :  1970                                          MRN:  [id number]         Date:  2023      Surgeon: Nico Escalona):  Nicola Hatfield MD    Procedure: Procedure(s):  EGD ESOPHAGOGASTRODUODENOSCOPY    Medications prior to admission:   Prior to Admission medications    Medication Sig Start Date End Date Taking? Authorizing Provider   amLODIPine (NORVASC) 10 MG tablet 1 tablet    Historical Provider, MD   ondansetron (ZOFRAN ODT) 4 MG disintegrating tablet Take 1 tablet by mouth every 8 hours as needed for Nausea 22   Cindy Anne MD   acetaminophen (TYLENOL) 500 MG tablet Take 1 tablet by mouth 4 times daily as needed for Pain 22  Josie Hussein DO   lidocaine (LIDODERM) 5 % Place 1 patch onto the skin daily 12 hours on, 12 hours off. Patient not taking: Reported on 2023   Josie Hussein DO   Multiple Vitamins-Minerals (MULTIVITAMIN ADULT) CHEW Take 1 tablet by mouth daily  Patient not taking: Reported on 2023   Josie Hussein DO   vitamin D (CHOLECALCIFEROL) 1000 UNIT TABS tablet Take 1,000 Units by mouth daily  Patient not taking: Reported on 2023    Historical Provider, MD   lisinopril (PRINIVIL;ZESTRIL) 20 MG tablet   Take 20 mg by mouth 2 times daily   Patient not taking: Reported on 2023 4/22/15   Historical Provider, MD   amphetamine-dextroamphetamine (ADDERALL) 10 MG tablet   Take 20 mg by mouth daily     Historical Provider, MD   escitalopram (LEXAPRO) 10 MG tablet Take 10 mg by mouth 2 times daily.     Historical Provider, MD       Current medications:    Current Facility-Administered Medications   Medication Dose Route Frequency Provider Last Rate Last Admin    butalbital-acetaminophen-caffeine (FIORICET, ESGIC) per tablet 1 tablet  1 tablet Oral Q6H PRN MALCOLM Knapp - CNP   1 tablet at 23 1508    insulin lispro (HUMALOG) injection vial 0-8 Units  0-8 Units SubCUTAneous TID WC Tiarra Guerrero MD        insulin lispro (HUMALOG) injection vial 0-4 Units  0-4 Units SubCUTAneous Nightly Tiarra Guerrero MD        insulin glargine (LANTUS) injection vial 10 Units  10 Units SubCUTAneous Nightly Tiarra Guerrero MD   10 Units at 01/08/23 2041    cefTRIAXone (ROCEPHIN) 1,000 mg in sterile water 10 mL IV syringe  1,000 mg IntraVENous Q24H Jacoby Hermosillo MD   1,000 mg at 01/09/23 0236    glucose chewable tablet 16 g  4 tablet Oral PRN Jacoby Hermosillo MD        dextrose bolus 10% 125 mL  125 mL IntraVENous PRN Jacoby Hermosillo MD        Or    dextrose bolus 10% 250 mL  250 mL IntraVENous PRN Jacoby Hermosillo MD        glucagon (rDNA) injection 1 mg  1 mg SubCUTAneous PRN Jacoby Hermosillo MD        dextrose 10 % infusion   IntraVENous Continuous PRN Jacoby Hermosillo MD        sodium chloride flush 0.9 % injection 10 mL  10 mL IntraVENous PRN Reji Peppers, APRN - CNP   10 mL at 01/07/23 0842    pantoprazole (PROTONIX) tablet 40 mg  40 mg Oral QAM AC Reji Peppers, APRN - CNP   40 mg at 01/08/23 0939    amitriptyline (ELAVIL) tablet 12.5 mg  12.5 mg Oral Nightly Reji Peppers, APRN - CNP   12.5 mg at 01/08/23 2034    sodium chloride flush 0.9 % injection 5-40 mL  5-40 mL IntraVENous 2 times per day Reji Peppers, APRN - CNP   10 mL at 01/08/23 0943    sodium chloride flush 0.9 % injection 5-40 mL  5-40 mL IntraVENous PRN Reji Peppers, APRN - CNP        0.9 % sodium chloride infusion   IntraVENous PRN Reji Peppers, APRN - CNP        enoxaparin (LOVENOX) injection 40 mg  40 mg SubCUTAneous Daily Reji Peppers, APRN - CNP   40 mg at 01/08/23 0940    ondansetron (ZOFRAN-ODT) disintegrating tablet 4 mg  4 mg Oral Q8H PRN Reji Peppers, APRN - CNP   4 mg at 01/07/23 0020    Or    ondansetron (ZOFRAN) injection 4 mg  4 mg IntraVENous Q6H PRN Reji Peppers, APRN - CNP   4 mg at 01/07/23 0308    polyethylene glycol (GLYCOLAX) packet 17 g  17 g Oral Daily PRN Reji Peppers, APRN - CNP  acetaminophen (TYLENOL) tablet 650 mg  650 mg Oral Q6H PRN Keyon Spear, APRN - CNP   650 mg at 01/08/23 1507    Or    acetaminophen (TYLENOL) suppository 650 mg  650 mg Rectal Q6H PRN Keyon Spear, APRN - CNP        amLODIPine (NORVASC) tablet 10 mg  10 mg Oral Daily [place] Spear, APRN - CNP        amphetamine-dextroamphetamine (ADDERALL) tablet 10 mg  10 mg Oral BID AC [place] Spear, APRN - CNP   10 mg at 01/08/23 1722    escitalopram (LEXAPRO) tablet 20 mg  20 mg Oral Daily Keyon Spear, APRN - CNP   20 mg at 01/08/23 0939    spironolactone (ALDACTONE) tablet 25 mg  25 mg Oral Daily Keyon Spear, APRN - CNP   25 mg at 01/08/23 0939    atorvastatin (LIPITOR) tablet 20 mg  20 mg Oral Nightly [place] Spear, APRN - CNP   20 mg at 01/08/23 2033    tiZANidine (ZANAFLEX) tablet 2 mg  2 mg Oral Nightly Mendel Rebolledo MD   2 mg at 01/08/23 2034       Allergies: Allergies   Allergen Reactions    Bee Venom     Tigan [Trimethobenzamide Hcl] Other (See Comments)     Does not remember reaction    Toprol Xl [Metoprolol] Other (See Comments)     Does not remember reaction    Toradol [Ketorolac Tromethamine] Other (See Comments)     hallucination    Imitrex [Sumatriptan] Nausea And Vomiting       Problem List:    Patient Active Problem List   Diagnosis Code    Anxiety F41.9    Depression F32. A    MS (multiple sclerosis) ([facility] [address] G35    Osteoporosis M81.0    HTN (hypertension) I10    Optic neuritis due to multiple sclerosis (HCC) H46.9, G35    Numbness R20.0    Migraines G43.909    Hyperparathyroidism (HCC) E21.3    Hip pain M25.559    Chronic steroid use [id number]    Multiple sclerosis (HCC) G35    Multiple sclerosis exacerbation (HCC) G35    Acute intractable tension-type headache G44. 12    Type 2 diabetes mellitus, with long-term current use of insulin (HCC) E11.9, Z79.4    UTI (urinary tract infection) N39.0    Bandemia F85.339       Past Medical History:        Diagnosis Date  Anxiety     Depression     Hypertension     Kidney stones     Multiple sclerosis ([facility] [address] 2001    Osteoporosis 2015       Past Surgical History:        Procedure Laterality Date    APPENDECTOMY  2001    CHOLECYSTECTOMY  2000    HYSTERECTOMY (CERVIX STATUS UNKNOWN)  2005    complete with ovary removal    KIDNEY STONE SURGERY  2014    TONSILLECTOMY AND ADENOIDECTOMY  1978       Social History:    Social History     Tobacco Use    Smoking status: Passive Smoke Exposure - Never Smoker    Smokeless tobacco: Never   Substance Use Topics    Alcohol use: No                                Counseling given: Not Answered      Vital Signs (Current):   Vitals:    01/07/23 1530 01/07/23 2028 01/08/23 0745 01/08/23 1951   BP: 133/74 120/75 104/65 109/67   Pulse: 90 85 68 78   Resp: 20 18 16 18   Temp:  98.4 °F (36.9 °C) 97.2 °F (36.2 °C) 98.1 °F (36.7 °C)   TempSrc:  Oral  Oral   SpO2:  97% 96% 100%   Weight:       Height:                                                  BP Readings from Last 3 Encounters:   01/08/23 109/67   11/12/22 (!) 141/93   01/18/22 128/87       NPO Status:                                                                                 BMI:   Wt Readings from Last 3 Encounters:   01/06/23 206 lb (93.4 kg)   01/06/23 200 lb (90.7 kg)   11/12/22 200 lb (90.7 kg)     Body mass index is 31.32 kg/m².     CBC:   Lab Results   Component Value Date/Time    WBC 11.1 01/09/2023 05:47 AM    RBC 4.12 01/09/2023 05:47 AM    HGB 12.5 01/09/2023 05:47 AM    HCT 37.6 01/09/2023 05:47 AM    MCV 91.3 01/09/2023 05:47 AM    RDW 11.9 01/09/2023 05:47 AM     01/09/2023 05:47 AM       CMP:   Lab Results   Component Value Date/Time     01/09/2023 05:47 AM    K 4.1 01/09/2023 05:47 AM     01/09/2023 05:47 AM    CO2 25 01/09/2023 05:47 AM    BUN 20 01/09/2023 05:47 AM    CREATININE 0.67 01/09/2023 05:47 AM    GFRAA >60 01/18/2022 09:20 PM    LABGLOM >60 01/09/2023 05:47 AM    GLUCOSE 147 01/09/2023 05:47 AM    PROT 8.0 11/12/2022 02:45 PM    CALCIUM 9.2 01/09/2023 05:47 AM    BILITOT 0.8 11/12/2022 02:45 PM    ALKPHOS 82 11/12/2022 02:45 PM    AST 32 11/12/2022 02:45 PM    ALT 33 11/12/2022 02:45 PM       POC Tests:   Recent Labs     01/08/23 2006   POCGLU 163*       Coags: No results found for: PROTIME, INR, APTT    HCG (If Applicable): No results found for: PREGTESTUR, PREGSERUM, HCG, HCGQUANT     ABGs: No results found for: PHART, PO2ART, WHD2MZA, RGD8BJD, BEART, S0KRSVQO     Type & Screen (If Applicable):  No results found for: LABABO, LABRH    Drug/Infectious Status (If Applicable):  No results found for: HIV, HEPCAB    COVID-19 Screening (If Applicable):   Lab Results   Component Value Date/Time    COVID19 Not Detected 11/12/2022 02:47 PM           Anesthesia Evaluation  Patient summary reviewed and Nursing notes reviewed  Airway: Mallampati: II  TM distance: >3 FB   Neck ROM: full  Mouth opening: > = 3 FB   Dental:    (+) upper dentures      Pulmonary: breath sounds clear to auscultation                             Cardiovascular:    (+) hypertension:,       ECG reviewed  Rhythm: regular  Rate: normal                    Neuro/Psych:   (+) neuromuscular disease: multiple sclerosis, headaches:, psychiatric history:            GI/Hepatic/Renal:             Endo/Other:    (+) DiabetesType II DM, , .                 Abdominal:             Vascular: Other Findings:           Anesthesia Plan      MAC     ASA 3       Induction: intravenous. Anesthetic plan and risks discussed with patient. Plan discussed with CRNA.                     Jessenia Yin MD   1/9/2023

## 2023-01-09 NOTE — PROGRESS NOTES
NEUROLOGY INPATIENT PROGRESS NOTE    1/9/2023         Current Exam:     Chart reviewed. Discussed with RN. EGD completed today, findings are normal.  The patient reports she continues to feel better and stronger each day. She continues on antibiotic for UTI. She does continue to have reported mild headache as well, is tolerating the Elavil without any problems. Will be started on metformin on discharge for her elevated blood sugars. Brief History: Carolee Mars is a  45 y.o. female with H/O MS, who was admitted on 1/6/2023 with concern of MS flare. The patient reports a history of relapsing remitting possible sclerosis with the onset of 2001. She has seen neurologists in the past but is currently in between providers as she had a recent insurance change. In the past she has tried Avonex, Rebif, Copaxone but was not able to tolerate any of these due to side effects. She is not currently on any disease modifying therapies. She presents today with complaints of an MS flare that started right around Marty time. She has right-sided numbness to her arm and leg at baseline but reports this is worse than normal, as well as a feeling of neck pressure/pain starting in the back of her neck and radiating into her right arm. She also reports a headache and blurred vision with generalized weakness. She has had increased stress at home lately due to her dad's diagnosis of cancer. She did move in with him to help care for him. She does not use a cane or a walker at home but does admit to frequent falls. No recent imaging available for review in her chart. She reports typically getting an MS flare approximately every 6 months. She is typically treated with high-dose steroids with improvement in the symptoms. She denies any dysphagia or trouble breathing. MRI brain and cervical spine were negative for any evidence of an active lesion. She was found to have a UTI and was started on antibiotics. No current facility-administered medications on file prior to encounter. Current Outpatient Medications on File Prior to Encounter   Medication Sig Dispense Refill    amLODIPine (NORVASC) 10 MG tablet 1 tablet      ondansetron (ZOFRAN ODT) 4 MG disintegrating tablet Take 1 tablet by mouth every 8 hours as needed for Nausea 10 tablet 0    acetaminophen (TYLENOL) 500 MG tablet Take 1 tablet by mouth 4 times daily as needed for Pain 40 tablet 0    lidocaine (LIDODERM) 5 % Place 1 patch onto the skin daily 12 hours on, 12 hours off. (Patient not taking: Reported on 1/6/2023) 30 patch 0    Multiple Vitamins-Minerals (MULTIVITAMIN ADULT) CHEW Take 1 tablet by mouth daily (Patient not taking: Reported on 1/6/2023) 30 tablet 0    vitamin D (CHOLECALCIFEROL) 1000 UNIT TABS tablet Take 1,000 Units by mouth daily (Patient not taking: Reported on 1/6/2023)      lisinopril (PRINIVIL;ZESTRIL) 20 MG tablet   Take 20 mg by mouth 2 times daily  (Patient not taking: Reported on 1/6/2023)      amphetamine-dextroamphetamine (ADDERALL) 10 MG tablet   Take 20 mg by mouth daily       escitalopram (LEXAPRO) 10 MG tablet Take 10 mg by mouth 2 times daily. Allergies: Yolanda Monge is allergic to bee venom, tigan [trimethobenzamide hcl], toprol xl [metoprolol], toradol [ketorolac tromethamine], and imitrex [sumatriptan]. Past Medical History:   Diagnosis Date    Anxiety     Depression     Hypertension     Kidney stones     Multiple sclerosis ([facility] [address] 2001    Osteoporosis 2015       Past Surgical History:   Procedure Laterality Date    APPENDECTOMY  2001    CHOLECYSTECTOMY  2000    HYSTERECTOMY (CERVIX STATUS UNKNOWN)  2005    complete with ovary removal    KIDNEY STONE SURGERY  2014    TONSILLECTOMY AND ADENOIDECTOMY  1978       Social History: Yolanda Monge  reports that she is a non-smoker but has been exposed to tobacco smoke.  She has never used smokeless tobacco. She reports that she does not drink alcohol and does not use drugs. Family History   Problem Relation Age of Onset    Hypertension Mother     Diabetes Mother     Thyroid Disease Mother     Mult Sclerosis Father     Kidney Disease Maternal Grandmother        Objective:   /67   Pulse 78   Temp 98.1 °F (36.7 °C) (Oral)   Resp 18   Ht 5' 8\" (1.727 m)   Wt 206 lb (93.4 kg)   SpO2 100%   BMI 31.32 kg/m²     Blood pressure range: Systolic (67SCY), ZOY:859 , Min:109 , DXK:317   ; Diastolic (68XQV), ENM:83, Min:67, Max:67      Review of Systems:  Constitutional  Negative for fever and chills    HEENT  Negative for ear discharge, ear pain, nosebleed    Eyes  Negative for photophobia, pain and discharge    Respiratory  Negative for hemoptysis and sputum    Cardiovascular  Negative for orthopnea, claudication and PND    Gastrointestinal  Negative for abdominal pain, diarrhea, blood in stool    Musculoskeletal  Negative for joint pain, negative for myalgia    Skin  Negative for rash or itching    Endo/heme/allergies  Negative for polydipsia, environmental allergy    Psychiatric/behavioral  Negative for suicidal ideation.  Patient is not anxious           NEUROLOGIC EXAMINATION  GENERAL  Appears comfortable and in no distress   HEENT  NC/ AT   NECK  Supple   MENTAL STATUS:  Alert, oriented, intact memory, no confusion, normal speech, normal language, no hallucination or delusion   CRANIAL NERVES: II     -      Visual fields intact to confrontation  III,IV,VI -  EOMs full, no afferent defect, no JAEL, no ptosis  V     -     Normal facial sensation  VII    -     Normal facial symmetry  VIII   -     Intact hearing  IX,X -     Symmetrical palate  XI    -     Symmetrical shoulder shrug  XII   -     Midline tongue, no atrophy    MOTOR FUNCTION:  Giveway to BUE, 5/5 to LLE, 5-/5 to RLE with normal bulk, normal tone and no involuntary movements, no tremor   SENSORY FUNCTION:  Decreased sensation to right arm and leg   CEREBELLAR FUNCTION:  Intact fine motor control over upper limbs   REFLEX FUNCTION:  Symmetric, no perverted reflex, no Babinski sign   STATION and GAIT  Not tested        Data:    Lab Results:   CBC:   Recent Labs     01/07/23  0425 01/08/23  0600 01/09/23  0547   WBC 5.1 13.3* 11.1   HGB 12.8 11.9 12.5    204 198     BMP:    Recent Labs     01/07/23  0425 01/08/23  0600 01/09/23  0547   * 135 139   K 4.5 4.6 4.1   CL 98 103 104   CO2 17* 24 25   BUN 20 20 20   CREATININE 0.95* 0.71 0.67   GLUCOSE 466* 276* 147*         Lab Results   Component Value Date    ALT 33 11/12/2022    AST 32 (H) 11/12/2022    LABA1C 6.7 (H) 01/07/2023           Diagnostic data reviewed:  MRI BRAIN (1/7/23) -  Multiple (greater than 5) scattered T2/FLAIR hyperintensities present with a   distribution compatible with history of multiple sclerosis. No prior imaging   available for comparison at time of dictation. No associated diffusion   restriction or contrast enhancement appreciated to suggest active disease. MRI CERVICAL SPINE (1/7/23) -  1. No convincing abnormal spinal cord signal or enhancement appreciated. 2. Mild multilevel cervical spondylosis. Impression:  -Worsening neck pain, blurred vision, right sided numbness, and generalized weakness for the past 2 weeks with no active MS lesions on imaging; suspect worsening of symptoms is secondary to UTI  -UTI  -Headache  -History of MS  -Epigastric pain    Plan:  No active lesions on imaging as above  Will discharge on Keflex for UTI  Continue PPI on discharge; outpatient follow-up with GI in 4 weeks if symptoms persist  Internal medicine consulted for elevated blood sugar; appreciate recs. Will discharge on metformin  DVT prophylaxis; Lovenox  Discharge planning    Please note that this note was generated using a voice recognition dictation software. Although every effort was made to ensure the accuracy of this automated transcription, some errors in transcription may have occurred.

## 2023-02-07 PROBLEM — N39.0 UTI (URINARY TRACT INFECTION): Status: RESOLVED | Noted: 2023-01-08 | Resolved: 2023-02-07

## 2023-07-31 ENCOUNTER — APPOINTMENT (OUTPATIENT)
Dept: CT IMAGING | Age: 53
DRG: 690 | End: 2023-07-31
Payer: MEDICARE

## 2023-07-31 ENCOUNTER — HOSPITAL ENCOUNTER (INPATIENT)
Age: 53
LOS: 1 days | Discharge: HOME | DRG: 690 | End: 2023-08-01
Attending: EMERGENCY MEDICINE | Admitting: INTERNAL MEDICINE
Payer: MEDICARE

## 2023-07-31 ENCOUNTER — APPOINTMENT (OUTPATIENT)
Dept: GENERAL RADIOLOGY | Age: 53
DRG: 690 | End: 2023-07-31
Payer: MEDICARE

## 2023-07-31 DIAGNOSIS — N12 PYELONEPHRITIS: ICD-10-CM

## 2023-07-31 DIAGNOSIS — A41.9 SEPTICEMIA (HCC): Primary | ICD-10-CM

## 2023-07-31 PROBLEM — N20.0 KIDNEY STONES: Status: ACTIVE | Noted: 2023-07-31

## 2023-07-31 PROBLEM — R39.11 URINARY HESITANCY: Status: ACTIVE | Noted: 2023-07-31

## 2023-07-31 PROBLEM — R16.2 HEPATOSPLENOMEGALY: Status: ACTIVE | Noted: 2023-07-31

## 2023-07-31 PROBLEM — R10.9 ACUTE LEFT FLANK PAIN: Status: ACTIVE | Noted: 2023-07-31

## 2023-07-31 PROBLEM — R11.2 NAUSEA & VOMITING: Status: ACTIVE | Noted: 2023-07-31

## 2023-07-31 LAB
ALBUMIN SERPL-MCNC: 4.3 G/DL (ref 3.5–5.2)
ALBUMIN/GLOB SERPL: 1.7 {RATIO} (ref 1–2.5)
ALP SERPL-CCNC: 114 U/L (ref 35–104)
ALT SERPL-CCNC: 41 U/L (ref 5–33)
ANION GAP SERPL CALCULATED.3IONS-SCNC: 11 MMOL/L (ref 9–17)
ANION GAP SERPL CALCULATED.3IONS-SCNC: 14 MMOL/L (ref 9–17)
AST SERPL-CCNC: 36 U/L
BACTERIA URNS QL MICRO: ABNORMAL
BASOPHILS # BLD: 0.08 K/UL (ref 0–0.2)
BASOPHILS NFR BLD: 1 % (ref 0–2)
BILIRUB SERPL-MCNC: 0.9 MG/DL (ref 0.3–1.2)
BILIRUB UR QL STRIP: NEGATIVE
BUN SERPL-MCNC: 8 MG/DL (ref 6–20)
BUN SERPL-MCNC: 9 MG/DL (ref 6–20)
CALCIUM SERPL-MCNC: 8.9 MG/DL (ref 8.6–10.4)
CALCIUM SERPL-MCNC: 9.9 MG/DL (ref 8.6–10.4)
CHLORIDE SERPL-SCNC: 102 MMOL/L (ref 98–107)
CHLORIDE SERPL-SCNC: 106 MMOL/L (ref 98–107)
CHP ED QC CHECK: 182
CLARITY UR: ABNORMAL
CO2 SERPL-SCNC: 22 MMOL/L (ref 20–31)
CO2 SERPL-SCNC: 22 MMOL/L (ref 20–31)
COLOR UR: YELLOW
CREAT SERPL-MCNC: 0.5 MG/DL (ref 0.5–0.9)
CREAT SERPL-MCNC: 0.6 MG/DL (ref 0.5–0.9)
EOSINOPHIL # BLD: 0 K/UL (ref 0–0.4)
EOSINOPHILS RELATIVE PERCENT: 0 % (ref 1–4)
EPI CELLS #/AREA URNS HPF: ABNORMAL /HPF (ref 0–5)
ERYTHROCYTE [DISTWIDTH] IN BLOOD BY AUTOMATED COUNT: 12 % (ref 11.8–14.4)
ERYTHROCYTE [DISTWIDTH] IN BLOOD BY AUTOMATED COUNT: 12.3 % (ref 11.8–14.4)
GFR SERPL CREATININE-BSD FRML MDRD: >60 ML/MIN/1.73M2
GFR SERPL CREATININE-BSD FRML MDRD: >60 ML/MIN/1.73M2
GLUCOSE SERPL-MCNC: 169 MG/DL (ref 70–99)
GLUCOSE SERPL-MCNC: 195 MG/DL (ref 70–99)
GLUCOSE UR STRIP-MCNC: ABNORMAL MG/DL
HCT VFR BLD AUTO: 35.8 % (ref 36.3–47.1)
HCT VFR BLD AUTO: 38.9 % (ref 36.3–47.1)
HGB BLD-MCNC: 11.5 G/DL (ref 11.9–15.1)
HGB BLD-MCNC: 13.4 G/DL (ref 11.9–15.1)
HGB UR QL STRIP.AUTO: ABNORMAL
IMM GRANULOCYTES # BLD AUTO: 0 K/UL (ref 0–0.3)
IMM GRANULOCYTES NFR BLD: 0 %
KETONES UR STRIP-MCNC: ABNORMAL MG/DL
LACTIC ACID, WHOLE BLOOD: 1.4 MMOL/L (ref 0.7–2.1)
LACTIC ACID, WHOLE BLOOD: 1.6 MMOL/L (ref 0.7–2.1)
LACTIC ACID, WHOLE BLOOD: 2.5 MMOL/L (ref 0.7–2.1)
LEUKOCYTE ESTERASE UR QL STRIP: ABNORMAL
LYMPHOCYTES NFR BLD: 0.42 K/UL (ref 1–4.8)
LYMPHOCYTES RELATIVE PERCENT: 5 % (ref 24–44)
MAGNESIUM SERPL-MCNC: 1.8 MG/DL (ref 1.6–2.6)
MCH RBC QN AUTO: 30.7 PG (ref 25.2–33.5)
MCH RBC QN AUTO: 30.8 PG (ref 25.2–33.5)
MCHC RBC AUTO-ENTMCNC: 32.1 G/DL (ref 28.4–34.8)
MCHC RBC AUTO-ENTMCNC: 34.4 G/DL (ref 28.4–34.8)
MCV RBC AUTO: 89 FL (ref 82.6–102.9)
MCV RBC AUTO: 96 FL (ref 82.6–102.9)
METER GLUCOSE: 123 MG/DL (ref 65–105)
METER GLUCOSE: 152 MG/DL (ref 65–105)
METER GLUCOSE: 171 MG/DL (ref 65–105)
METER GLUCOSE: 182 MG/DL (ref 65–105)
METER GLUCOSE: 283 MG/DL (ref 65–105)
MONOCYTES NFR BLD: 0 % (ref 1–7)
MONOCYTES NFR BLD: 0 K/UL (ref 0.1–0.8)
MORPHOLOGY: NORMAL
NEUTROPHILS NFR BLD: 94 % (ref 36–66)
NEUTS SEG NFR BLD: 7.9 K/UL (ref 1.8–7.7)
NITRITE UR QL STRIP: POSITIVE
NRBC BLD-RTO: 0 PER 100 WBC
NRBC BLD-RTO: 0 PER 100 WBC
PH UR STRIP: 5 [PH] (ref 5–8)
PLATELET # BLD AUTO: 170 K/UL (ref 138–453)
PLATELET # BLD AUTO: 182 K/UL (ref 138–453)
PMV BLD AUTO: 10.7 FL (ref 8.1–13.5)
PMV BLD AUTO: 10.8 FL (ref 8.1–13.5)
POTASSIUM SERPL-SCNC: 3.3 MMOL/L (ref 3.7–5.3)
POTASSIUM SERPL-SCNC: 3.8 MMOL/L (ref 3.7–5.3)
PROT SERPL-MCNC: 6.9 G/DL (ref 6.4–8.3)
PROT UR STRIP-MCNC: ABNORMAL MG/DL
RBC # BLD AUTO: 3.73 M/UL (ref 3.95–5.11)
RBC # BLD AUTO: 4.37 M/UL (ref 3.95–5.11)
RBC #/AREA URNS HPF: ABNORMAL /HPF (ref 0–4)
SODIUM SERPL-SCNC: 138 MMOL/L (ref 135–144)
SODIUM SERPL-SCNC: 139 MMOL/L (ref 135–144)
SP GR UR STRIP: 1.02 (ref 1–1.03)
TROPONIN I SERPL HS-MCNC: 7 NG/L (ref 0–14)
TROPONIN I SERPL HS-MCNC: 8 NG/L (ref 0–14)
UROBILINOGEN UR STRIP-ACNC: NORMAL EU/DL (ref 0–1)
WBC #/AREA URNS HPF: ABNORMAL /HPF (ref 0–5)
WBC OTHER # BLD: 11.4 K/UL (ref 3.5–11.3)
WBC OTHER # BLD: 8.4 K/UL (ref 3.5–11.3)

## 2023-07-31 PROCEDURE — 99222 1ST HOSP IP/OBS MODERATE 55: CPT | Performed by: INTERNAL MEDICINE

## 2023-07-31 PROCEDURE — 6370000000 HC RX 637 (ALT 250 FOR IP): Performed by: NURSE PRACTITIONER

## 2023-07-31 PROCEDURE — 84484 ASSAY OF TROPONIN QUANT: CPT

## 2023-07-31 PROCEDURE — 81001 URINALYSIS AUTO W/SCOPE: CPT

## 2023-07-31 PROCEDURE — 6360000002 HC RX W HCPCS: Performed by: NURSE PRACTITIONER

## 2023-07-31 PROCEDURE — 87086 URINE CULTURE/COLONY COUNT: CPT

## 2023-07-31 PROCEDURE — 1200000000 HC SEMI PRIVATE

## 2023-07-31 PROCEDURE — 80053 COMPREHEN METABOLIC PANEL: CPT

## 2023-07-31 PROCEDURE — 87088 URINE BACTERIA CULTURE: CPT

## 2023-07-31 PROCEDURE — 80048 BASIC METABOLIC PNL TOTAL CA: CPT

## 2023-07-31 PROCEDURE — 87077 CULTURE AEROBIC IDENTIFY: CPT

## 2023-07-31 PROCEDURE — 83735 ASSAY OF MAGNESIUM: CPT

## 2023-07-31 PROCEDURE — 87186 SC STD MICRODIL/AGAR DIL: CPT

## 2023-07-31 PROCEDURE — 6370000000 HC RX 637 (ALT 250 FOR IP): Performed by: INTERNAL MEDICINE

## 2023-07-31 PROCEDURE — 82947 ASSAY GLUCOSE BLOOD QUANT: CPT

## 2023-07-31 PROCEDURE — 71045 X-RAY EXAM CHEST 1 VIEW: CPT

## 2023-07-31 PROCEDURE — 87040 BLOOD CULTURE FOR BACTERIA: CPT

## 2023-07-31 PROCEDURE — 96375 TX/PRO/DX INJ NEW DRUG ADDON: CPT

## 2023-07-31 PROCEDURE — 85025 COMPLETE CBC W/AUTO DIFF WBC: CPT

## 2023-07-31 PROCEDURE — 85027 COMPLETE CBC AUTOMATED: CPT

## 2023-07-31 PROCEDURE — 2580000003 HC RX 258: Performed by: STUDENT IN AN ORGANIZED HEALTH CARE EDUCATION/TRAINING PROGRAM

## 2023-07-31 PROCEDURE — 2580000003 HC RX 258: Performed by: NURSE PRACTITIONER

## 2023-07-31 PROCEDURE — 74176 CT ABD & PELVIS W/O CONTRAST: CPT

## 2023-07-31 PROCEDURE — 83605 ASSAY OF LACTIC ACID: CPT

## 2023-07-31 PROCEDURE — 96374 THER/PROPH/DIAG INJ IV PUSH: CPT

## 2023-07-31 PROCEDURE — 99285 EMERGENCY DEPT VISIT HI MDM: CPT

## 2023-07-31 PROCEDURE — 6360000002 HC RX W HCPCS: Performed by: STUDENT IN AN ORGANIZED HEALTH CARE EDUCATION/TRAINING PROGRAM

## 2023-07-31 PROCEDURE — 51798 US URINE CAPACITY MEASURE: CPT

## 2023-07-31 PROCEDURE — 6370000000 HC RX 637 (ALT 250 FOR IP): Performed by: STUDENT IN AN ORGANIZED HEALTH CARE EDUCATION/TRAINING PROGRAM

## 2023-07-31 RX ORDER — AMITRIPTYLINE HYDROCHLORIDE 25 MG/1
12.5 TABLET, FILM COATED ORAL NIGHTLY
Status: DISCONTINUED | OUTPATIENT
Start: 2023-07-31 | End: 2023-08-01 | Stop reason: HOSPADM

## 2023-07-31 RX ORDER — SODIUM CHLORIDE 9 MG/ML
INJECTION, SOLUTION INTRAVENOUS PRN
Status: DISCONTINUED | OUTPATIENT
Start: 2023-07-31 | End: 2023-08-01 | Stop reason: HOSPADM

## 2023-07-31 RX ORDER — BUTALBITAL, ACETAMINOPHEN AND CAFFEINE 50; 325; 40 MG/1; MG/1; MG/1
1 TABLET ORAL EVERY 6 HOURS PRN
Status: DISCONTINUED | OUTPATIENT
Start: 2023-07-31 | End: 2023-08-01 | Stop reason: HOSPADM

## 2023-07-31 RX ORDER — DIPHENHYDRAMINE HYDROCHLORIDE 50 MG/ML
12.5 INJECTION INTRAMUSCULAR; INTRAVENOUS ONCE
Status: COMPLETED | OUTPATIENT
Start: 2023-07-31 | End: 2023-07-31

## 2023-07-31 RX ORDER — ATORVASTATIN CALCIUM 20 MG/1
20 TABLET, FILM COATED ORAL NIGHTLY
Status: DISCONTINUED | OUTPATIENT
Start: 2023-07-31 | End: 2023-08-01 | Stop reason: HOSPADM

## 2023-07-31 RX ORDER — ONDANSETRON 2 MG/ML
4 INJECTION INTRAMUSCULAR; INTRAVENOUS EVERY 6 HOURS PRN
Status: DISCONTINUED | OUTPATIENT
Start: 2023-07-31 | End: 2023-08-01 | Stop reason: HOSPADM

## 2023-07-31 RX ORDER — AMLODIPINE BESYLATE 5 MG/1
10 TABLET ORAL DAILY
Status: DISCONTINUED | OUTPATIENT
Start: 2023-07-31 | End: 2023-08-01

## 2023-07-31 RX ORDER — M-VIT,TX,IRON,MINS/CALC/FOLIC 27MG-0.4MG
1 TABLET ORAL DAILY
Status: DISCONTINUED | OUTPATIENT
Start: 2023-07-31 | End: 2023-08-01 | Stop reason: HOSPADM

## 2023-07-31 RX ORDER — ONDANSETRON 2 MG/ML
4 INJECTION INTRAMUSCULAR; INTRAVENOUS ONCE
Status: COMPLETED | OUTPATIENT
Start: 2023-07-31 | End: 2023-07-31

## 2023-07-31 RX ORDER — ESCITALOPRAM OXALATE 10 MG/1
10 TABLET ORAL 2 TIMES DAILY
Status: DISCONTINUED | OUTPATIENT
Start: 2023-07-31 | End: 2023-07-31

## 2023-07-31 RX ORDER — ACETAMINOPHEN 325 MG/1
650 TABLET ORAL EVERY 6 HOURS PRN
Status: DISCONTINUED | OUTPATIENT
Start: 2023-07-31 | End: 2023-08-01 | Stop reason: HOSPADM

## 2023-07-31 RX ORDER — ACETAMINOPHEN 325 MG/1
650 TABLET ORAL EVERY 6 HOURS PRN
Status: DISCONTINUED | OUTPATIENT
Start: 2023-07-31 | End: 2023-07-31 | Stop reason: SDUPTHER

## 2023-07-31 RX ORDER — DEXTROAMPHETAMINE SACCHARATE, AMPHETAMINE ASPARTATE, DEXTROAMPHETAMINE SULFATE AND AMPHETAMINE SULFATE 2.5; 2.5; 2.5; 2.5 MG/1; MG/1; MG/1; MG/1
20 TABLET ORAL DAILY
Status: DISCONTINUED | OUTPATIENT
Start: 2023-07-31 | End: 2023-08-01 | Stop reason: HOSPADM

## 2023-07-31 RX ORDER — SODIUM CHLORIDE 9 MG/ML
INJECTION, SOLUTION INTRAVENOUS CONTINUOUS
Status: DISCONTINUED | OUTPATIENT
Start: 2023-07-31 | End: 2023-08-01 | Stop reason: HOSPADM

## 2023-07-31 RX ORDER — METOCLOPRAMIDE HYDROCHLORIDE 5 MG/ML
10 INJECTION INTRAMUSCULAR; INTRAVENOUS ONCE
Status: COMPLETED | OUTPATIENT
Start: 2023-07-31 | End: 2023-07-31

## 2023-07-31 RX ORDER — SODIUM CHLORIDE 0.9 % (FLUSH) 0.9 %
5-40 SYRINGE (ML) INJECTION PRN
Status: DISCONTINUED | OUTPATIENT
Start: 2023-07-31 | End: 2023-08-01 | Stop reason: HOSPADM

## 2023-07-31 RX ORDER — ACETAMINOPHEN 500 MG
1000 TABLET ORAL ONCE
Status: COMPLETED | OUTPATIENT
Start: 2023-07-31 | End: 2023-07-31

## 2023-07-31 RX ORDER — HYDROCODONE BITARTRATE AND ACETAMINOPHEN 5; 325 MG/1; MG/1
1 TABLET ORAL EVERY 4 HOURS PRN
Status: DISCONTINUED | OUTPATIENT
Start: 2023-07-31 | End: 2023-08-01 | Stop reason: HOSPADM

## 2023-07-31 RX ORDER — ACETAMINOPHEN 650 MG/1
650 SUPPOSITORY RECTAL EVERY 6 HOURS PRN
Status: DISCONTINUED | OUTPATIENT
Start: 2023-07-31 | End: 2023-08-01 | Stop reason: HOSPADM

## 2023-07-31 RX ORDER — ESCITALOPRAM OXALATE 10 MG/1
20 TABLET ORAL NIGHTLY
Status: DISCONTINUED | OUTPATIENT
Start: 2023-07-31 | End: 2023-08-01 | Stop reason: HOSPADM

## 2023-07-31 RX ORDER — 0.9 % SODIUM CHLORIDE 0.9 %
1000 INTRAVENOUS SOLUTION INTRAVENOUS ONCE
Status: COMPLETED | OUTPATIENT
Start: 2023-07-31 | End: 2023-07-31

## 2023-07-31 RX ORDER — ENOXAPARIN SODIUM 100 MG/ML
40 INJECTION SUBCUTANEOUS DAILY
Status: DISCONTINUED | OUTPATIENT
Start: 2023-07-31 | End: 2023-08-01 | Stop reason: HOSPADM

## 2023-07-31 RX ORDER — HYDROCODONE BITARTRATE AND ACETAMINOPHEN 5; 325 MG/1; MG/1
2 TABLET ORAL EVERY 4 HOURS PRN
Status: DISCONTINUED | OUTPATIENT
Start: 2023-07-31 | End: 2023-08-01 | Stop reason: HOSPADM

## 2023-07-31 RX ORDER — MORPHINE SULFATE 4 MG/ML
4 INJECTION, SOLUTION INTRAMUSCULAR; INTRAVENOUS ONCE
Status: COMPLETED | OUTPATIENT
Start: 2023-07-31 | End: 2023-07-31

## 2023-07-31 RX ORDER — TAMSULOSIN HYDROCHLORIDE 0.4 MG/1
0.4 CAPSULE ORAL DAILY
Status: DISCONTINUED | OUTPATIENT
Start: 2023-07-31 | End: 2023-08-01 | Stop reason: HOSPADM

## 2023-07-31 RX ORDER — ONDANSETRON 4 MG/1
4 TABLET, ORALLY DISINTEGRATING ORAL EVERY 8 HOURS PRN
Status: DISCONTINUED | OUTPATIENT
Start: 2023-07-31 | End: 2023-08-01 | Stop reason: HOSPADM

## 2023-07-31 RX ORDER — POLYETHYLENE GLYCOL 3350 17 G/17G
17 POWDER, FOR SOLUTION ORAL DAILY PRN
Status: DISCONTINUED | OUTPATIENT
Start: 2023-07-31 | End: 2023-08-01 | Stop reason: HOSPADM

## 2023-07-31 RX ORDER — SODIUM CHLORIDE 0.9 % (FLUSH) 0.9 %
5-40 SYRINGE (ML) INJECTION EVERY 12 HOURS SCHEDULED
Status: DISCONTINUED | OUTPATIENT
Start: 2023-07-31 | End: 2023-08-01 | Stop reason: HOSPADM

## 2023-07-31 RX ADMIN — SODIUM CHLORIDE: 9 INJECTION, SOLUTION INTRAVENOUS at 05:02

## 2023-07-31 RX ADMIN — SODIUM CHLORIDE, PRESERVATIVE FREE 10 ML: 5 INJECTION INTRAVENOUS at 21:00

## 2023-07-31 RX ADMIN — CEFTRIAXONE SODIUM 1000 MG: 1 INJECTION, POWDER, FOR SOLUTION INTRAMUSCULAR; INTRAVENOUS at 03:41

## 2023-07-31 RX ADMIN — BUTALBITAL, ACETAMINOPHEN, AND CAFFEINE 1 TABLET: 50; 325; 40 TABLET ORAL at 14:28

## 2023-07-31 RX ADMIN — DIPHENHYDRAMINE HYDROCHLORIDE 12.5 MG: 50 INJECTION, SOLUTION INTRAMUSCULAR; INTRAVENOUS at 03:15

## 2023-07-31 RX ADMIN — TAMSULOSIN HYDROCHLORIDE 0.4 MG: 0.4 CAPSULE ORAL at 22:16

## 2023-07-31 RX ADMIN — HYDROCODONE BITARTRATE AND ACETAMINOPHEN 1 TABLET: 5; 325 TABLET ORAL at 19:56

## 2023-07-31 RX ADMIN — ESCITALOPRAM OXALATE 20 MG: 10 TABLET ORAL at 22:16

## 2023-07-31 RX ADMIN — ONDANSETRON 4 MG: 2 INJECTION INTRAMUSCULAR; INTRAVENOUS at 00:58

## 2023-07-31 RX ADMIN — AMITRIPTYLINE HYDROCHLORIDE 12.5 MG: 25 TABLET, FILM COATED ORAL at 22:16

## 2023-07-31 RX ADMIN — SODIUM CHLORIDE 1000 ML: 9 INJECTION, SOLUTION INTRAVENOUS at 01:00

## 2023-07-31 RX ADMIN — SODIUM CHLORIDE: 9 INJECTION, SOLUTION INTRAVENOUS at 22:24

## 2023-07-31 RX ADMIN — MORPHINE SULFATE 4 MG: 4 INJECTION INTRAVENOUS at 02:15

## 2023-07-31 RX ADMIN — HYDROCODONE BITARTRATE AND ACETAMINOPHEN 1 TABLET: 5; 325 TABLET ORAL at 06:57

## 2023-07-31 RX ADMIN — ATORVASTATIN CALCIUM 20 MG: 20 TABLET, FILM COATED ORAL at 22:16

## 2023-07-31 RX ADMIN — ACETAMINOPHEN 1000 MG: 500 TABLET ORAL at 01:01

## 2023-07-31 RX ADMIN — METOCLOPRAMIDE 10 MG: 5 INJECTION, SOLUTION INTRAMUSCULAR; INTRAVENOUS at 03:15

## 2023-07-31 RX ADMIN — ENOXAPARIN SODIUM 40 MG: 100 INJECTION SUBCUTANEOUS at 07:47

## 2023-07-31 ASSESSMENT — PAIN DESCRIPTION - DESCRIPTORS
DESCRIPTORS: SHARP
DESCRIPTORS: ACHING
DESCRIPTORS: NAGGING;ACHING
DESCRIPTORS: ACHING;SHARP
DESCRIPTORS: ACHING
DESCRIPTORS: ACHING
DESCRIPTORS: ACHING;SHARP

## 2023-07-31 ASSESSMENT — PAIN DESCRIPTION - ONSET
ONSET: ON-GOING
ONSET: ON-GOING

## 2023-07-31 ASSESSMENT — PAIN DESCRIPTION - LOCATION
LOCATION: BACK
LOCATION: HEAD
LOCATION: FLANK
LOCATION: BACK
LOCATION: FLANK
LOCATION: FLANK
LOCATION: HEAD

## 2023-07-31 ASSESSMENT — PAIN SCALES - GENERAL
PAINLEVEL_OUTOF10: 4
PAINLEVEL_OUTOF10: 5
PAINLEVEL_OUTOF10: 8
PAINLEVEL_OUTOF10: 4
PAINLEVEL_OUTOF10: 4
PAINLEVEL_OUTOF10: 8

## 2023-07-31 ASSESSMENT — PAIN DESCRIPTION - FREQUENCY: FREQUENCY: INTERMITTENT

## 2023-07-31 ASSESSMENT — PAIN DESCRIPTION - ORIENTATION
ORIENTATION: LEFT
ORIENTATION: LEFT
ORIENTATION: ANTERIOR
ORIENTATION: LEFT
ORIENTATION: LEFT

## 2023-07-31 ASSESSMENT — PAIN DESCRIPTION - PAIN TYPE: TYPE: CHRONIC PAIN

## 2023-07-31 ASSESSMENT — PAIN DESCRIPTION - DIRECTION: RADIATING_TOWARDS: DENIES

## 2023-07-31 ASSESSMENT — PAIN - FUNCTIONAL ASSESSMENT
PAIN_FUNCTIONAL_ASSESSMENT: PREVENTS OR INTERFERES SOME ACTIVE ACTIVITIES AND ADLS
PAIN_FUNCTIONAL_ASSESSMENT: 0-10

## 2023-07-31 NOTE — ED NOTES
The following labs were labeled with appropriate pt sticker and tubed to lab:     [] Blue     [] Lavender   [] on ice  [] Green/yellow  [] Green/black [] on ice  [] [employer]  [] on ice  [] Yellow  [] Red  [] Type/ Screen  [] ABG  [] VBG    [] COVID-19 swab    [] Rapid  [] PCR  [] Flu swab  [] Peds Viral Panel     [x] Urine Sample  [] Fecal Sample  [] Pelvic Cultures  [] Blood Cultures  [] X 2  [] STREP Cultures       Arjun Cedeño RN  07/31/23 3213

## 2023-07-31 NOTE — ED NOTES
Report given to [facility]  No change in patient status  Continues to rest quietly       Tex Celestin, [address]  07/31/23 2486

## 2023-07-31 NOTE — ED PROVIDER NOTES
[facility]  Emergency Department Encounter  Emergency Medicine Resident     Pt Name:Elizabeth Sanchez  MRN: [id number]  [address] [place] 1970  Date of evaluation: 7/31/23  PCP:  Arianna Mosquera PA-C  Note Started: 1:12 AM EDT      CHIEF COMPLAINT       Fevers, flank pain    HISTORY OF PRESENT ILLNESS  (Location/Symptom, Timing/Onset, Context/Setting, Quality, Duration, Modifying Factors, Severity.)      Carie Mccauley is a 45 y.o. female who presents with flank pain, left side greater than right side, fevers, chills, nausea that have been going on for the past day. Patient has a history of kidney stones and hypertension. Patient states that her symptoms currently feel like a kidney stone or kidney infection. Patient states that she is also having some mild suprapubic abdominal pain. Patient is not currently taking any antibiotics. PAST MEDICAL / SURGICAL / SOCIAL / FAMILY HISTORY      has a past medical history of Anxiety, Depression, Hypertension, Kidney stones, Multiple sclerosis ([address]), and Osteoporosis. has a past surgical history that includes Hysterectomy (01/01/2005); Tonsillectomy and adenoidectomy (01/01/1978); Cholecystectomy (01/01/2000); Appendectomy (01/01/2001); Kidney stone surgery (01/01/2014); Esophagogastroduodenoscopy; and Upper gastrointestinal endoscopy (N/A, 1/9/2023).       Social History     Socioeconomic History    Marital status: Unknown     Spouse name: Not on file    Number of children: Not on file    Years of education: Not on file    Highest education level: Not on file   Occupational History    Not on file   Tobacco Use    Smoking status: Passive Smoke Exposure - Never Smoker    Smokeless tobacco: Never   Substance and Sexual Activity    Alcohol use: No    Drug use: No    Sexual activity: Never   Other Topics Concern    Not on file   Social History Narrative    Not on file     Social Determinants of Health     Financial Resource Strain: Not on file   Food
[facility] ED  Emergency Department  Emergency Medicine Resident Turn-Over     Note Started: 3:42 AM EDT    Care of Salvatore Hwang was assumed from Dr. Dean Casanova and is being seen for No chief complaint on file. .  The patient's initial evaluation and plan have been discussed with the prior provider who initially evaluated the patient.      EMERGENCY DEPARTMENT COURSE / MEDICAL DECISION MAKING:       MEDICATIONS GIVEN:  Orders Placed This Encounter   Medications    sodium chloride 0.9 % bolus 1,000 mL    acetaminophen (TYLENOL) tablet 1,000 mg    ondansetron (ZOFRAN) injection 4 mg    morphine injection 4 mg    metoclopramide (REGLAN) injection 10 mg    diphenhydrAMINE (BENADRYL) injection 12.5 mg    cefTRIAXone (ROCEPHIN) 1,000 mg in sodium chloride 0.9 % 50 mL IVPB (mini-bag)     Order Specific Question:   Antimicrobial Indications     Answer:   Urinary Tract Infection       LABS / RADIOLOGY:     Labs Reviewed   CBC WITH AUTO DIFFERENTIAL - Abnormal; Notable for the following components:       Result Value    Neutrophils % 94 (*)     Lymphocytes % 5 (*)     Monocytes % 0 (*)     Eosinophils % 0 (*)     Neutrophils Absolute 7.90 (*)     Lymphocytes Absolute 0.42 (*)     Monocytes Absolute 0.00 (*)     All other components within normal limits   COMPREHENSIVE METABOLIC PANEL - Abnormal; Notable for the following components:    Glucose 195 (*)     Potassium 3.3 (*)     Alkaline Phosphatase 114 (*)     ALT 41 (*)     AST 36 (*)     All other components within normal limits   LACTIC ACID - Abnormal; Notable for the following components:    Lactic Acid, Whole Blood 2.5 (*)     All other components within normal limits   URINALYSIS WITH MICROSCOPIC - Abnormal; Notable for the following components:    Turbidity UA Turbid (*)     Glucose, Ur 2+ (*)     Ketones, Urine TRACE (*)     Urine Hgb SMALL (*)     Protein, UA 1+ (*)     Nitrite, Urine POSITIVE (*)     Leukocyte Esterase, Urine MODERATE (*)
diaphoretic. Heart sounds are tachycardic with no murmurs. Lungs auscultation. Abdomen is soft nondistended with bilateral CVA tenderness left worse than right. Alert and oriented x4 no focal deficits    Medical Decision Making  Does appear to be acutely ill however no clear source at this time. Basic labs  CT scan to rule out nephrolithiasis  Urine    Patient CT scan does not show any nephrolithiasis, however does show some stranding around the kidney concerning for pyelonephritis and given patient's symptoms and fever, believe that this is likely the source of her infection at this time. Therefore given that we have found a source of infection she now meets sepsis criteria at 3:09AM when CT read was available. Still awaiting urine, will go ahead and treat with Rocephin and admit    Problems Addressed:  Pyelonephritis: acute illness or injury  Septicemia [facility]): acute illness or injury    Amount and/or Complexity of Data Reviewed  External Data Reviewed: notes. Labs: ordered. Radiology: ordered and independent interpretation performed. ECG/medicine tests: ordered. Risk  OTC drugs. Prescription drug management. Decision regarding hospitalization.          Critical Care: None     Nathalie Cerna MD  Attending Emergency Physician         Nathalie Ceran MD  08/02/23 9[address] [facility] MD Kaylyn  08/02/23 6625

## 2023-07-31 NOTE — PLAN OF CARE
Problem: Chronic Conditions and Co-morbidities  Goal: Patient's chronic conditions and co-morbidity symptoms are monitored and maintained or improved  Outcome: Progressing  Flowsheets (Taken 7/31/2023 0751)  Care Plan - Patient's Chronic Conditions and Co-Morbidity Symptoms are Monitored and Maintained or Improved:   Monitor and assess patient's chronic conditions and comorbid symptoms for stability, deterioration, or improvement   Collaborate with multidisciplinary team to address chronic and comorbid conditions and prevent exacerbation or deterioration   Update acute care plan with appropriate goals if chronic or comorbid symptoms are exacerbated and prevent overall improvement and discharge     Problem: Discharge Planning  Goal: Discharge to home or other facility with appropriate resources  Outcome: Progressing  Flowsheets (Taken 7/31/2023 0751)  Discharge to home or other facility with appropriate resources:   Identify barriers to discharge with patient and caregiver   Arrange for needed discharge resources and transportation as appropriate   Identify discharge learning needs (meds, wound care, etc)   Refer to discharge planning if patient needs post-hospital services based on physician order or complex needs related to functional status, cognitive ability or social support system     Problem: Pain  Goal: Verbalizes/displays adequate comfort level or baseline comfort level  Outcome: Progressing

## 2023-07-31 NOTE — H&P
[facility]  Office: [address], DO, Naheed Sims, DO, Gennaro Rose, DO, Kelley Kaiser Blood, DO, Minerva King MD, Ashlie Flores MD, Amy Harris MD, Juany Maher MD,  Rachael Humphrey MD, Iman Castaneda MD, Josette De La Vega, DO, Ernestine Maria MD,  Rod Gibson MD, Tristin Walker MD, Iva Larkin, DO, Lani Mckenzie MD,  Rene Marinelli, DO, Linden Rubi MD, Rosa Bernard MD, Alon Brown MD, Danny aZmbrano MD,  Kathy Barnard MD, Cherry Baron MD, Rabon Kocher, DO, Ever Arredondo MD,  Jevon Hobbs MD, Zaki Angulo, CNP,  Hammad Grossman, CNP, Tamara Aparicio, CNP, Maura Brown, CNP,  Rhina Ellis, [facility], Maritza Carvalho, CNP, Ramon Benavides, CNP, Jorje Engle, CNP, Ellie Jacinto, CNP, Pascale Sorensen, CNP, Andres Pino PA-C, Kaylene Proctor, CNS, Cher Vasquez, CNP, Mirna Caceres, CNP         [facility]    HISTORY AND PHYSICAL EXAMINATION            Date:   7/31/2023  Patient name:  Ryan Stafford  Date of admission:  7/31/2023 12:25 AM  MRN:   [id number]  Account:  [de-identified]  YOB: 1970  PCP:    Mary Gutierrez PA-C  Room:   18/18  Code Status:    Full Code    Chief Complaint:     Sabrina Ruck pain\"    History Obtained From:     patient    History of Present Illness: Ryan Stafford is a 45 y.o.  female with DM 2, relapsing remitting MS, hypertension with anxiety/depression, prior kidney stones who presents with acute nausea vomiting with left flank pain with fevers and dysuria and is admitted to the hospital for the management of Pyelonephritis. Patient describes abrupt onset that woke her from sleep yesterday morning around 8 AM with acute left flank pain. Denies radiation. Denies abdominal pain.  + Nausea status post multiple episodes of emesis.   Fevers with temperature of 104.1.  + Dysuria with frequency and urgency throughout the day.  + Dizziness status

## 2023-07-31 NOTE — CONSULTS
[facility], & [facility]  Urology Consult      Patient:  Cuca Dominguez  MRN: [id number]  YOB: 1970    CHIEF COMPLAINT:  L flank pain, pyelo    HISTORY OF PRESENT ILLNESS:   The patient is a 45 y.o. female with Hx MS and urolithiasis in the past who presented with fevers Tmax 104, left flank pain, and lower urinary tract symptoms including dysuria, frequency, and urgency. Urology was consulted for dilated left sided collecting system. CT abd pel wo contrast shows very minimal caliectasis at best on the left side. Perinephric stranding, no hydro on right side. No urolithiasis seen in either kidney bilaterally. No evidence of obstructive uropathy. Cr 0.5 at baseline, mild white ct 11.4. On rocephin. Patient's old records, notes and chart reviewed and summarized above.     Past Medical History:    Past Medical History:   Diagnosis Date    Anxiety     Depression     Hypertension     Kidney stones     Multiple sclerosis ([address]) 2001    Osteoporosis 2015       Past Surgical History:    Past Surgical History:   Procedure Laterality Date    APPENDECTOMY  01/01/2001    CHOLECYSTECTOMY  01/01/2000    ESOPHAGOGASTRODUODENOSCOPY      HYSTERECTOMY (CERVIX STATUS UNKNOWN)  01/01/2005    complete with ovary removal    KIDNEY STONE SURGERY  01/01/2014    TONSILLECTOMY AND ADENOIDECTOMY  01/01/1978    UPPER GASTROINTESTINAL ENDOSCOPY N/A 1/9/2023    EGD ESOPHAGOGASTRODUODENOSCOPY performed by Jennifer Chavez MD at [address]       Medications:      Current Facility-Administered Medications:     0.9 % sodium chloride infusion, , IntraVENous, Continuous, Arpita Mew, APRN - CNP, Last Rate: 100 mL/hr at 07/31/23 0502, New Bag at 07/31/23 0502    sodium chloride flush 0.9 % injection 5-40 mL, 5-40 mL, IntraVENous, 2 times per day, Arpita Mew, APRN - CNP    sodium chloride flush 0.9 % injection 5-40 mL, 5-40 mL, IntraVENous, PRN, Arpita Mew, APRN - CNP    0.9 % sodium chloride infusion, , IntraVENous,

## 2023-07-31 NOTE — ED NOTES
Pt to ED via EMS for left sided flank pain. Pt reports having had kidney stones in the past and states her pain is similar. Pt admits to nausea and vomiting. Pt aox4, placed on full monitor, IV placed, blood obtained and sent to lab.       [place], [place]  07/31/23 8079

## 2023-07-31 NOTE — ED NOTES
The following labs were labeled with appropriate pt sticker and tubed to lab:     [] Blue     [x] Lavender   [] on ice  [x] Green/yellow  [x] Green/black [] on ice  [] Vinny Stamp  [] on ice  [] Yellow  [] Red  [] Type/ Screen  [] ABG  [] VBG    [] COVID-19 swab    [] Rapid  [] PCR  [] Flu swab  [] Peds Viral Panel     [] Urine Sample  [] Fecal Sample  [] Pelvic Cultures  [] Blood Cultures  [] X 2  [] STREP Cultures       Ankita Fernandez RN  07/31/23 9796

## 2023-07-31 NOTE — CARE COORDINATION
Case Management Assessment  Initial Evaluation    Date/Time of Evaluation: 7/31/2023 10:53 AM  Assessment Completed by: Cori Trevino RN    If patient is discharged prior to next notation, then this note serves as note for discharge by case management. Patient Name: Raven Rebolledo                   YOB: 1970  Diagnosis: Pyelonephritis [N12]  Septicemia [facility]) [A41.9]                   Date / Time: 7/31/2023 12:25 AM    Home address: [address] [place] [address], [address] [place] [address]    Patient Admission Status: Inpatient   Readmission Risk (Low < 19, Mod (19-27), High > 27): Readmission Risk Score: 9.2    Current PCP: Kaitlin Sol PA-C  PCP verified by CM? (P) Yes    Chart Reviewed: Yes      History Provided by: (P) Patient  Patient Orientation: (P) Alert and Oriented    Patient Cognition: (P) Alert    Hospitalization in the last 30 days (Readmission):  No    If yes, Readmission Assessment in CM Navigator will be completed.     Advance Directives:      Code Status: Full Code   Patient's Primary Decision Maker is:        Discharge Planning:    Patient lives with: (P) Children Type of Home: (P) House  Primary Care Giver: (P) Self (marin Del Angel lives with pt [phone])  Patient Support Systems include: (P) Children   Current Financial resources: (P) Medicare, Medicaid  Current community resources: (P) None  Current services prior to admission: (P) None            Current DME:              Type of Home Care services:  (P) None    ADLS  Prior functional level: (P) Independent in ADLs/IADLs  Current functional level: (P) Independent in ADLs/IADLs    PT AM-PAC:   /24  OT AM-PAC:   /24    Family can provide assistance at DC: (P) Yes  Would you like Case Management to discuss the discharge plan with any other family members/significant others, and if so, who? (P) No  Plans to Return to Present Housing: (P) Yes  Other Identified Issues/Barriers to RETURNING to current housing: none  Potential

## 2023-08-01 VITALS
HEART RATE: 74 BPM | DIASTOLIC BLOOD PRESSURE: 62 MMHG | SYSTOLIC BLOOD PRESSURE: 114 MMHG | TEMPERATURE: 98.2 F | BODY MASS INDEX: 32.08 KG/M2 | WEIGHT: 211.64 LBS | HEIGHT: 68 IN | OXYGEN SATURATION: 97 % | RESPIRATION RATE: 17 BRPM

## 2023-08-01 PROBLEM — K57.90 DIVERTICULOSIS: Status: ACTIVE | Noted: 2023-08-01

## 2023-08-01 PROBLEM — E66.9 OBESITY (BMI 30-39.9): Status: ACTIVE | Noted: 2023-08-01

## 2023-08-01 PROBLEM — A41.9 SEPTICEMIA (HCC): Status: ACTIVE | Noted: 2023-08-01

## 2023-08-01 PROBLEM — B96.20 E. COLI UTI: Status: ACTIVE | Noted: 2023-01-08

## 2023-08-01 LAB
ANION GAP SERPL CALCULATED.3IONS-SCNC: 11 MMOL/L (ref 9–17)
BUN SERPL-MCNC: 7 MG/DL (ref 6–20)
CALCIUM SERPL-MCNC: 8.9 MG/DL (ref 8.6–10.4)
CHLORIDE SERPL-SCNC: 108 MMOL/L (ref 98–107)
CO2 SERPL-SCNC: 21 MMOL/L (ref 20–31)
CREAT SERPL-MCNC: 0.5 MG/DL (ref 0.5–0.9)
ERYTHROCYTE [DISTWIDTH] IN BLOOD BY AUTOMATED COUNT: 12.4 % (ref 11.8–14.4)
GFR SERPL CREATININE-BSD FRML MDRD: >60 ML/MIN/1.73M2
GLUCOSE SERPL-MCNC: 126 MG/DL (ref 70–99)
HAV IGM SERPL QL IA: NONREACTIVE
HBV CORE IGM SERPL QL IA: NONREACTIVE
HBV SURFACE AG SERPL QL IA: NONREACTIVE
HCT VFR BLD AUTO: 33.9 % (ref 36.3–47.1)
HCV AB SERPL QL IA: NONREACTIVE
HETEROPH AB BLD QL IA: NEGATIVE
HGB BLD-MCNC: 11.3 G/DL (ref 11.9–15.1)
MCH RBC QN AUTO: 30.7 PG (ref 25.2–33.5)
MCHC RBC AUTO-ENTMCNC: 33.3 G/DL (ref 28.4–34.8)
MCV RBC AUTO: 92.1 FL (ref 82.6–102.9)
METER GLUCOSE: 120 MG/DL (ref 65–105)
METER GLUCOSE: 158 MG/DL (ref 65–105)
NRBC BLD-RTO: 0 PER 100 WBC
PLATELET # BLD AUTO: 142 K/UL (ref 138–453)
PMV BLD AUTO: 11.3 FL (ref 8.1–13.5)
POTASSIUM SERPL-SCNC: 4.1 MMOL/L (ref 3.7–5.3)
RBC # BLD AUTO: 3.68 M/UL (ref 3.95–5.11)
REASON FOR REJECTION: NORMAL
SODIUM SERPL-SCNC: 140 MMOL/L (ref 135–144)
SPECIMEN SOURCE: NORMAL
WBC OTHER # BLD: 5.2 K/UL (ref 3.5–11.3)
ZZ NTE CLEAN UP: ORDERED TEST: NORMAL

## 2023-08-01 PROCEDURE — 6370000000 HC RX 637 (ALT 250 FOR IP): Performed by: INTERNAL MEDICINE

## 2023-08-01 PROCEDURE — 86308 HETEROPHILE ANTIBODY SCREEN: CPT

## 2023-08-01 PROCEDURE — 6360000002 HC RX W HCPCS: Performed by: NURSE PRACTITIONER

## 2023-08-01 PROCEDURE — 86664 EPSTEIN-BARR NUCLEAR ANTIGEN: CPT

## 2023-08-01 PROCEDURE — 2580000003 HC RX 258: Performed by: NURSE PRACTITIONER

## 2023-08-01 PROCEDURE — 86665 EPSTEIN-BARR CAPSID VCA: CPT

## 2023-08-01 PROCEDURE — 86645 CMV ANTIBODY IGM: CPT

## 2023-08-01 PROCEDURE — 86644 CMV ANTIBODY: CPT

## 2023-08-01 PROCEDURE — 82947 ASSAY GLUCOSE BLOOD QUANT: CPT

## 2023-08-01 PROCEDURE — 99238 HOSP IP/OBS DSCHRG MGMT 30/<: CPT | Performed by: INTERNAL MEDICINE

## 2023-08-01 PROCEDURE — 80048 BASIC METABOLIC PNL TOTAL CA: CPT

## 2023-08-01 PROCEDURE — 80074 ACUTE HEPATITIS PANEL: CPT

## 2023-08-01 PROCEDURE — 51798 US URINE CAPACITY MEASURE: CPT

## 2023-08-01 PROCEDURE — 85027 COMPLETE CBC AUTOMATED: CPT

## 2023-08-01 PROCEDURE — 36415 COLL VENOUS BLD VENIPUNCTURE: CPT

## 2023-08-01 PROCEDURE — 86663 EPSTEIN-BARR ANTIBODY: CPT

## 2023-08-01 RX ORDER — CEFUROXIME AXETIL 500 MG/1
500 TABLET ORAL 2 TIMES DAILY
Qty: 14 TABLET | Refills: 0 | Status: SHIPPED | OUTPATIENT
Start: 2023-08-01 | End: 2023-08-08

## 2023-08-01 RX ORDER — AMLODIPINE BESYLATE 5 MG/1
10 TABLET ORAL NIGHTLY
Status: DISCONTINUED | OUTPATIENT
Start: 2023-08-02 | End: 2023-08-01 | Stop reason: HOSPADM

## 2023-08-01 RX ADMIN — ENOXAPARIN SODIUM 40 MG: 100 INJECTION SUBCUTANEOUS at 09:11

## 2023-08-01 RX ADMIN — BUTALBITAL, ACETAMINOPHEN, AND CAFFEINE 1 TABLET: 50; 325; 40 TABLET ORAL at 09:09

## 2023-08-01 RX ADMIN — TAMSULOSIN HYDROCHLORIDE 0.4 MG: 0.4 CAPSULE ORAL at 09:10

## 2023-08-01 RX ADMIN — HYDROCODONE BITARTRATE AND ACETAMINOPHEN 1 TABLET: 5; 325 TABLET ORAL at 17:19

## 2023-08-01 RX ADMIN — CEFTRIAXONE SODIUM 1000 MG: 10 INJECTION, POWDER, FOR SOLUTION INTRAVENOUS at 04:07

## 2023-08-01 RX ADMIN — BUTALBITAL, ACETAMINOPHEN, AND CAFFEINE 1 TABLET: 50; 325; 40 TABLET ORAL at 02:09

## 2023-08-01 RX ADMIN — Medication 1 TABLET: at 09:08

## 2023-08-01 RX ADMIN — SODIUM CHLORIDE, PRESERVATIVE FREE 10 ML: 5 INJECTION INTRAVENOUS at 09:11

## 2023-08-01 RX ADMIN — HYDROCODONE BITARTRATE AND ACETAMINOPHEN 1 TABLET: 5; 325 TABLET ORAL at 09:09

## 2023-08-01 RX ADMIN — SODIUM CHLORIDE: 9 INJECTION, SOLUTION INTRAVENOUS at 09:02

## 2023-08-01 ASSESSMENT — PAIN DESCRIPTION - LOCATION
LOCATION: ABDOMEN;FLANK
LOCATION: HEAD
LOCATION: ABDOMEN;BACK

## 2023-08-01 ASSESSMENT — PAIN SCALES - GENERAL
PAINLEVEL_OUTOF10: 4
PAINLEVEL_OUTOF10: 6
PAINLEVEL_OUTOF10: 8

## 2023-08-01 ASSESSMENT — ENCOUNTER SYMPTOMS
SHORTNESS OF BREATH: 0
COUGH: 0
NAUSEA: 0
ABDOMINAL PAIN: 0
VOMITING: 0

## 2023-08-01 NOTE — PROGRESS NOTES
[facility]  Office: [address], DO, Dixie Aloe, DO, Sergo Essex, DO, Aditi Cole Blood, DO, Mine Valentin MD, Iman Rosa MD, Ganga Blanco MD, Robert Mitchell MD,  Kierra Bland MD, Rolan Gomes MD, Katiana He, DO, Cassidy Herrera MD,  Niharika Davis MD, Haley Melendez MD, Chauncey Bauman, DO, Marti Locke MD,  Tessa Belle, DO, Mercy Trivedi MD, Trey Clemons MD, Lanre Lentz MD, Avani Adan MD,  Avelino Brink MD, Hanane Steiner MD, Maddison Louis DO, Mario Mccarthy MD,  Kely Mccarthy MD, Prudence Solis, CNP,  Scot Johnson, CNP, Caridad Phelps, CNP, Mohsen Morris, CNP,  Ebonie Carcamo, [facility], Shaun Vaughan, CNP, Kirstie Keys, CNP, Penny Weston, CNP, Buddy Jackson, CNP, Tabitha Zambrano, CNP, José Manuel Rm PA-C, Vaughn Michele, [facility], Ailyn Vasquez, CNP, Filomena Lopez [facility]    Progress Note    8/1/2023    4:27 PM    Name:   True Goddard  MRN:     [id number]     [address]:      [de-identified]   Room:   12/12[facility]:  1  Admit Date:  7/31/2023 12:25 AM    PCP:   Charmayne Skipper, PA-C  Code Status:  Full Code    Subjective:     C/C:   Fever  Flank pain  Dysuria    Interval History Status:   Improving  Afebrile  Less malaise  Less left flank pain  Chills and sweats resolving  Voiding without difficulty  Doing okay with diet  Hoping to go home    Data Base Updates:  Afebrile    WBC5.2k/uL RBC3.68 Low m/uL Carwdxscwq74.3 Low      Updated: 08/01/23 0807 Specimen Source. BLOOD Ordered TestCBC Reason for RejectionUnable to perform testing: Specimen quantity not sufficient. Culture, Urine [[id number]] (Abnormal) Collected: 07/31/23 0307 Updated: 08/01/23 0817 Specimen Source: Urine, clean catch Specimen Description. CLEAN CATCH URINE CultureESCHERICHIA COLI >100,000 CFU/ML Abnormal      [id number] High mg/dL     BUN7mg/dL Creatinine0.5     Brief History:     As documented in the

## 2023-08-01 NOTE — PLAN OF CARE
Problem: Chronic Conditions and Co-morbidities  Goal: Patient's chronic conditions and co-morbidity symptoms are monitored and maintained or improved  8/1/2023 1710 by Angelica Swanson RN  Outcome: Adequate for Discharge  8/1/2023 0405 by Elizabeth Keller RN  Outcome: Progressing     Problem: Discharge Planning  Goal: Discharge to home or other facility with appropriate resources  8/1/2023 1710 by Angelica Swanson RN  Outcome: Adequate for Discharge  8/1/2023 0405 by Elizabeth Keller RN  Outcome: Progressing     Problem: Pain  Goal: Verbalizes/displays adequate comfort level or baseline comfort level  8/1/2023 1710 by Angelica Swanson RN  Outcome: Adequate for Discharge  8/1/2023 0405 by Elizabeth Keller RN  Outcome: Progressing     Problem: Safety - Adult  Goal: Free from fall injury  8/1/2023 1710 by Angelica Swanson RN  Outcome: Adequate for Discharge  8/1/2023 0405 by Elizabeth Keller RN  Outcome: Progressing

## 2023-08-02 LAB
MICROORGANISM SPEC CULT: ABNORMAL
SPECIMEN DESCRIPTION: ABNORMAL

## 2023-08-02 NOTE — DISCHARGE SUMMARY
[facility]  Office: [address], DO, Rosetta Yuen, DO, Jeovanny Shaw, DO, Mumtaz Mancia Blood, DO, Michael Alex MD, Ludmila Luna MD, Marcus Castañeda MD, Elsa Betancur MD,  Jose Eduardo Gonzalez MD, Orlando Rush MD, Unruly Mckay DO, Светлана Cruz MD,  Tea Nicholas MD, Chula Caal MD, James Whitlock DO, Felicia Nunez MD,  Arlin Husain DO, Nayely Hanks MD, Zulay Nunez MD, Matt Vasquez MD, Yulia Jacob MD,  Stephy Doe MD, Tim Jones MD, Merle Irby DO, Yani Alex MD,  Alda Del Cid MD, Camila Fernandez, Nicola Durbin, CNP, Toney Hilton, CNP, Jewels Santos, CNP,  Julio Banks, DNP, Aidee Valentin, CNP, Alo Arroyo, CNP, Wiley Gale, CNP, Anneliese Falk, CNP, Douglas Urias, CNP, Jelly Chambers PA-C, Babs Chavez, CNS, Mercer Fabry, CNP, Lloyd Doty [facility]    Discharge Summary    Patient ID: Sandra Partida  :  1970   MRN: [id number]     ACCOUNT:  [de-identified]   Patient's PCP: Matt Esparza PA-C  Admit Date: 2023   Discharge Date: 2023    Discharge Physician: Sara Richardson DO     The patient was seen and examined on day of discharge and this discharge summary is in conjunction with any daily progress note from day of discharge.     Active Discharge Diagnoses:     Primary Problem  Pyelonephritis      Hospital Problems  Active Hospital Problems    Diagnosis Date Noted    Type 2 diabetes mellitus without complication, without long-term current use of insulin ([address]) [E11.9] 2023     Priority: Medium    E. coli UTI [N39.0, B96.20] 2023     Priority: Medium    Multiple sclerosis ([address]) Othel Plump 2023     Priority: Medium

## 2023-08-03 LAB
CMV IGG SERPL QL IA: 1.3
CMV IGM SERPL QL IA: 0.4
EBV EA-D IGG SER-ACNC: 36 U/ML
EBV INTERPRETATION: ABNORMAL
EBV NA IGG SER IA-ACNC: 458 U/ML
EBV VCA IGG SER-ACNC: 583 U/ML
EBV VCA IGM SER-ACNC: 11 U/ML

## 2023-08-05 LAB
MICROORGANISM SPEC CULT: NORMAL
MICROORGANISM SPEC CULT: NORMAL
SERVICE CMNT-IMP: NORMAL
SERVICE CMNT-IMP: NORMAL
SPECIMEN DESCRIPTION: NORMAL
SPECIMEN DESCRIPTION: NORMAL

## 2023-08-14 ENCOUNTER — HOSPITAL ENCOUNTER (EMERGENCY)
Age: 53
Discharge: HOME | End: 2023-08-14
Attending: EMERGENCY MEDICINE
Payer: MEDICARE

## 2023-08-14 VITALS
OXYGEN SATURATION: 99 % | DIASTOLIC BLOOD PRESSURE: 75 MMHG | RESPIRATION RATE: 16 BRPM | SYSTOLIC BLOOD PRESSURE: 117 MMHG | HEART RATE: 77 BPM | TEMPERATURE: 97.4 F

## 2023-08-14 DIAGNOSIS — N30.00 ACUTE CYSTITIS WITHOUT HEMATURIA: Primary | ICD-10-CM

## 2023-08-14 LAB
ALBUMIN SERPL-MCNC: 4.8 G/DL (ref 3.5–5.2)
ALBUMIN/GLOB SERPL: 1.6 {RATIO} (ref 1–2.5)
ALP SERPL-CCNC: 93 U/L (ref 35–104)
ALT SERPL-CCNC: 33 U/L (ref 5–33)
ANION GAP SERPL CALCULATED.3IONS-SCNC: 13 MMOL/L (ref 9–17)
AST SERPL-CCNC: 35 U/L
BACTERIA URNS QL MICRO: ABNORMAL
BASOPHILS # BLD: 0.08 K/UL (ref 0–0.2)
BASOPHILS NFR BLD: 1 % (ref 0–2)
BILIRUB SERPL-MCNC: 1 MG/DL (ref 0.3–1.2)
BILIRUB UR QL STRIP: NEGATIVE
BUN SERPL-MCNC: 17 MG/DL (ref 6–20)
CALCIUM SERPL-MCNC: 9.5 MG/DL (ref 8.6–10.4)
CASTS #/AREA URNS LPF: ABNORMAL /LPF (ref 0–2)
CASTS #/AREA URNS LPF: ABNORMAL /LPF (ref 0–2)
CHLORIDE SERPL-SCNC: 103 MMOL/L (ref 98–107)
CLARITY UR: ABNORMAL
CO2 SERPL-SCNC: 22 MMOL/L (ref 20–31)
COLOR UR: YELLOW
CREAT SERPL-MCNC: 0.6 MG/DL (ref 0.5–0.9)
EOSINOPHIL # BLD: 0.05 K/UL (ref 0–0.44)
EOSINOPHILS RELATIVE PERCENT: 0 % (ref 1–4)
EPI CELLS #/AREA URNS HPF: ABNORMAL /HPF (ref 0–5)
ERYTHROCYTE [DISTWIDTH] IN BLOOD BY AUTOMATED COUNT: 12.4 % (ref 11.8–14.4)
GFR SERPL CREATININE-BSD FRML MDRD: >60 ML/MIN/1.73M2
GLUCOSE SERPL-MCNC: 192 MG/DL (ref 70–99)
GLUCOSE UR STRIP-MCNC: NEGATIVE MG/DL
HCT VFR BLD AUTO: 39.7 % (ref 36.3–47.1)
HGB BLD-MCNC: 13.3 G/DL (ref 11.9–15.1)
HGB UR QL STRIP.AUTO: NEGATIVE
IMM GRANULOCYTES # BLD AUTO: 0.04 K/UL (ref 0–0.3)
IMM GRANULOCYTES NFR BLD: 0 %
KETONES UR STRIP-MCNC: NEGATIVE MG/DL
LEUKOCYTE ESTERASE UR QL STRIP: ABNORMAL
LYMPHOCYTES NFR BLD: 1.58 K/UL (ref 1.1–3.7)
LYMPHOCYTES RELATIVE PERCENT: 13 % (ref 24–43)
MCH RBC QN AUTO: 30.3 PG (ref 25.2–33.5)
MCHC RBC AUTO-ENTMCNC: 33.5 G/DL (ref 28.4–34.8)
MCV RBC AUTO: 90.4 FL (ref 82.6–102.9)
MONOCYTES NFR BLD: 0.75 K/UL (ref 0.1–1.2)
MONOCYTES NFR BLD: 6 % (ref 3–12)
NEUTROPHILS NFR BLD: 80 % (ref 36–65)
NEUTS SEG NFR BLD: 9.44 K/UL (ref 1.5–8.1)
NITRITE UR QL STRIP: NEGATIVE
NRBC BLD-RTO: 0 PER 100 WBC
PH UR STRIP: 5.5 [PH] (ref 5–8)
PLATELET # BLD AUTO: 288 K/UL (ref 138–453)
PMV BLD AUTO: 10.9 FL (ref 8.1–13.5)
POTASSIUM SERPL-SCNC: 4.3 MMOL/L (ref 3.7–5.3)
PROT SERPL-MCNC: 7.8 G/DL (ref 6.4–8.3)
PROT UR STRIP-MCNC: NEGATIVE MG/DL
RBC # BLD AUTO: 4.39 M/UL (ref 3.95–5.11)
RBC #/AREA URNS HPF: ABNORMAL /HPF (ref 0–2)
SODIUM SERPL-SCNC: 138 MMOL/L (ref 135–144)
SP GR UR STRIP: 1.02 (ref 1–1.03)
UROBILINOGEN UR STRIP-ACNC: NORMAL EU/DL (ref 0–1)
WBC #/AREA URNS HPF: ABNORMAL /HPF (ref 0–5)
WBC OTHER # BLD: 11.9 K/UL (ref 3.5–11.3)

## 2023-08-14 PROCEDURE — 51798 US URINE CAPACITY MEASURE: CPT

## 2023-08-14 PROCEDURE — 96374 THER/PROPH/DIAG INJ IV PUSH: CPT

## 2023-08-14 PROCEDURE — 85025 COMPLETE CBC W/AUTO DIFF WBC: CPT

## 2023-08-14 PROCEDURE — 80053 COMPREHEN METABOLIC PANEL: CPT

## 2023-08-14 PROCEDURE — 87086 URINE CULTURE/COLONY COUNT: CPT

## 2023-08-14 PROCEDURE — 96375 TX/PRO/DX INJ NEW DRUG ADDON: CPT

## 2023-08-14 PROCEDURE — 81001 URINALYSIS AUTO W/SCOPE: CPT

## 2023-08-14 PROCEDURE — 99284 EMERGENCY DEPT VISIT MOD MDM: CPT

## 2023-08-14 PROCEDURE — 6370000000 HC RX 637 (ALT 250 FOR IP)

## 2023-08-14 PROCEDURE — 6360000002 HC RX W HCPCS

## 2023-08-14 RX ORDER — PHENAZOPYRIDINE HYDROCHLORIDE 100 MG/1
200 TABLET, FILM COATED ORAL ONCE
Status: COMPLETED | OUTPATIENT
Start: 2023-08-14 | End: 2023-08-14

## 2023-08-14 RX ORDER — PHENAZOPYRIDINE HYDROCHLORIDE 200 MG/1
200 TABLET, FILM COATED ORAL 3 TIMES DAILY PRN
Qty: 6 TABLET | Refills: 0 | Status: SHIPPED | OUTPATIENT
Start: 2023-08-14 | End: 2023-08-17

## 2023-08-14 RX ORDER — MORPHINE SULFATE 4 MG/ML
4 INJECTION, SOLUTION INTRAMUSCULAR; INTRAVENOUS
Status: COMPLETED | OUTPATIENT
Start: 2023-08-14 | End: 2023-08-14

## 2023-08-14 RX ORDER — ONDANSETRON 2 MG/ML
4 INJECTION INTRAMUSCULAR; INTRAVENOUS ONCE
Status: COMPLETED | OUTPATIENT
Start: 2023-08-14 | End: 2023-08-14

## 2023-08-14 RX ORDER — PHENAZOPYRIDINE HYDROCHLORIDE 100 MG/1
200 TABLET, FILM COATED ORAL
Status: DISCONTINUED | OUTPATIENT
Start: 2023-08-14 | End: 2023-08-14

## 2023-08-14 RX ORDER — CIPROFLOXACIN 500 MG/1
500 TABLET, FILM COATED ORAL 2 TIMES DAILY
Qty: 14 TABLET | Refills: 0 | Status: SHIPPED | OUTPATIENT
Start: 2023-08-14 | End: 2023-08-21

## 2023-08-14 RX ORDER — CIPROFLOXACIN 500 MG/1
500 TABLET, FILM COATED ORAL ONCE
Status: COMPLETED | OUTPATIENT
Start: 2023-08-14 | End: 2023-08-14

## 2023-08-14 RX ADMIN — ONDANSETRON 4 MG: 2 INJECTION INTRAMUSCULAR; INTRAVENOUS at 11:41

## 2023-08-14 RX ADMIN — CIPROFLOXACIN 500 MG: 500 TABLET, FILM COATED ORAL at 14:08

## 2023-08-14 RX ADMIN — PHENAZOPYRIDINE HYDROCHLORIDE 200 MG: 100 TABLET ORAL at 14:08

## 2023-08-14 RX ADMIN — MORPHINE SULFATE 4 MG: 4 INJECTION INTRAVENOUS at 11:41

## 2023-08-14 ASSESSMENT — ENCOUNTER SYMPTOMS
COUGH: 0
VOMITING: 1
SHORTNESS OF BREATH: 0
NAUSEA: 1
ABDOMINAL PAIN: 0
DIARRHEA: 0
RHINORRHEA: 0
EYE PAIN: 0
BACK PAIN: 0
EYE REDNESS: 0
SORE THROAT: 0

## 2023-08-14 ASSESSMENT — PAIN SCALES - GENERAL
PAINLEVEL_OUTOF10: 7
PAINLEVEL_OUTOF10: 4

## 2023-08-14 ASSESSMENT — PAIN - FUNCTIONAL ASSESSMENT: PAIN_FUNCTIONAL_ASSESSMENT: 0-10

## 2023-08-14 NOTE — DISCHARGE INSTRUCTIONS
Take your medication as indicated and prescribed. If you are given an antibiotic then, make sure you get the prescription filled and take the antibiotics until finished. Drink plenty of water while taking the antibiotics. Avoid drinking alcohol or drinks that have caffeine in it while taking antibiotics. If you were given the medication pyridium (or take over the counter Azo) - do not wear any contacts for the next week since this medication will turn your tears an orange color and will stain the contacts. Your urine will also be this orange color. For pain use acetaminophen (Tylenol) or ibuprofen (Motrin / Advil), unless prescribed medications that have acetaminophen or ibuprofen (or similar medications) in it. You can take over the counter acetaminophen tablets (1 - 2 tablets of the 500-mg strength every 6 hours) or ibuprofen tablets (2 tablets every 4 hours). PLEASE RETURN TO THE EMERGENCY DEPARTMENT IMMEDIATELY for worsening symptoms, inability to urinate, worsening of blood in your urine, or if you develop any concerning symptoms such as: high fever not relieved by acetaminophen (Tylenol) and/or ibuprofen (Motrin / Advil), chills, shortness of breath, chest pain, feeling of your heart fluttering or racing, persistent nausea and/or vomiting, vomiting up blood, blood in your stool, loss of consciousness, numbness, weakness or tingling in the arms or legs or change in color of the extremities, changes in mental status, persistent headache, blurry vision, loss of bladder / bowel.

## 2023-08-14 NOTE — ED PROVIDER NOTES
Tonsillectomy and adenoidectomy (01/01/1978); Cholecystectomy (01/01/2000); Appendectomy (01/01/2001); Kidney stone surgery (01/01/2014); Esophagogastroduodenoscopy; and Upper gastrointestinal endoscopy (N/A, 1/9/2023). Social History     Socioeconomic History    Marital status: Unknown     Spouse name: Not on file    Number of children: Not on file    Years of education: Not on file    Highest education level: Not on file   Occupational History    Not on file   Tobacco Use    Smoking status: Passive Smoke Exposure - Never Smoker    Smokeless tobacco: Never   Substance and Sexual Activity    Alcohol use: No    Drug use: No    Sexual activity: Never   Other Topics Concern    Not on file   Social History Narrative    Not on file     Social Determinants of Health     Financial Resource Strain: Not on file   Food Insecurity: Not on file   Transportation Needs: Not on file   Physical Activity: Not on file   Stress: Not on file   Social Connections: Not on file   Intimate Partner Violence: Not on file   Housing Stability: Not on file       Family History   Problem Relation Age of Onset    Hypertension Mother     Diabetes Mother     Thyroid Disease Mother     Mult Sclerosis Father     Kidney Disease Maternal Grandmother        Allergies:  Bee venom, Tigan [trimethobenzamide hcl], Toprol xl [metoprolol], Toradol [ketorolac tromethamine], and Imitrex [sumatriptan]    Home Medications:  Prior to Admission medications    Medication Sig Start Date End Date Taking?  Authorizing Provider   ciprofloxacin (CIPRO) 500 MG tablet Take 1 tablet by mouth 2 times daily for 7 days 8/14/23 8/21/23 Yes Rony Chambers MD   phenazopyridine (PYRIDIUM) 200 MG tablet Take 1 tablet by mouth 3 times daily as needed for Pain (bladder spasm/pain) 8/14/23 8/17/23 Yes Rony Chambers MD   amitriptyline (ELAVIL) 25 MG tablet Take 0.5 tablets by mouth nightly 1/9/23   Ezekiel Stafford, APRN - CNP   butalbital-acetaminophen-caffeine or diaphoretic. HENT:      Head: Normocephalic and atraumatic. Nose: Nose normal.   Eyes:      Extraocular Movements: Extraocular movements intact. Conjunctiva/sclera: Conjunctivae normal.      Pupils: Pupils are equal, round, and reactive to light. Cardiovascular:      Rate and Rhythm: Normal rate and regular rhythm. Heart sounds: Normal heart sounds. No murmur heard. No friction rub. No gallop. Pulmonary:      Effort: Pulmonary effort is normal. No respiratory distress. Breath sounds: Normal breath sounds. No wheezing. Abdominal:      General: Abdomen is flat. Bowel sounds are normal. There is no distension. Palpations: Abdomen is soft. Tenderness: There is abdominal tenderness. There is left CVA tenderness. Musculoskeletal:         General: No tenderness. Normal range of motion. Cervical back: Normal range of motion. Skin:     General: Skin is warm and dry. Capillary Refill: Capillary refill takes less than 2 seconds. Findings: No rash. Neurological:      General: No focal deficit present. Mental Status: She is alert and oriented to person, place, and time. Mental status is at baseline. Motor: No abnormal muscle tone. Coordination: Coordination normal.   Psychiatric:         Mood and Affect: Mood normal.         Behavior: Behavior normal.         Thought Content: Thought content normal.         Judgment: Judgment normal.         DDX/DIAGNOSTIC RESULTS / EMERGENCY DEPARTMENT COURSE / MDM     Medical Decision Making  This is a 78-year-old female presenting with dysuria, decreased urination for past 2 days status post pyelonephritis admission 2 weeks ago. VSS. Concern includes pyelonephritis versus cystitis versus decreased concern for kidney stone due to negative CTAP approximately 2 weeks ago. Will order CBC, CMP, urine studies and ultrasound bladder to assess for retention. Amount and/or Complexity of Data Reviewed  Labs: ordered.

## 2023-08-14 NOTE — ED PROVIDER NOTES
ureterolithiasis or nephrolithiasis at that time  Pain and nausea control    Medical Decision Making  Amount and/or Complexity of Data Reviewed  Labs: ordered. Risk  Prescription drug management.         Lizzy Lu MD   Attending Emergency Physician    (Please note that portions of this note were completed with a voice recognition program. Efforts were made to edit the dictations but occasionally words are mis-transcribed.)            Lizzy Lu MD  08/14/23 5295

## 2023-08-15 LAB
MICROORGANISM SPEC CULT: NORMAL
SPECIMEN DESCRIPTION: NORMAL

## 2023-09-14 ENCOUNTER — HOSPITAL ENCOUNTER (EMERGENCY)
Age: 53
Discharge: HOME | End: 2023-09-14
Attending: EMERGENCY MEDICINE
Payer: MEDICARE

## 2023-09-14 ENCOUNTER — APPOINTMENT (OUTPATIENT)
Dept: CT IMAGING | Age: 53
End: 2023-09-14
Payer: MEDICARE

## 2023-09-14 VITALS
OXYGEN SATURATION: 97 % | WEIGHT: 202.6 LBS | TEMPERATURE: 99.8 F | SYSTOLIC BLOOD PRESSURE: 133 MMHG | HEART RATE: 78 BPM | HEIGHT: 68 IN | DIASTOLIC BLOOD PRESSURE: 86 MMHG | RESPIRATION RATE: 18 BRPM | BODY MASS INDEX: 30.71 KG/M2

## 2023-09-14 DIAGNOSIS — R10.9 RIGHT FLANK PAIN: Primary | ICD-10-CM

## 2023-09-14 LAB
ANION GAP SERPL CALCULATED.3IONS-SCNC: 12 MMOL/L (ref 9–17)
BASOPHILS # BLD: 0.07 K/UL (ref 0–0.2)
BASOPHILS NFR BLD: 1 % (ref 0–2)
BILIRUB UR QL STRIP: NEGATIVE
BUN SERPL-MCNC: 10 MG/DL (ref 6–20)
CALCIUM SERPL-MCNC: 9.3 MG/DL (ref 8.6–10.4)
CASTS #/AREA URNS LPF: NORMAL /LPF (ref 0–8)
CHLORIDE SERPL-SCNC: 102 MMOL/L (ref 98–107)
CLARITY UR: ABNORMAL
CO2 SERPL-SCNC: 23 MMOL/L (ref 20–31)
COLOR UR: YELLOW
CREAT SERPL-MCNC: 0.6 MG/DL (ref 0.5–0.9)
EOSINOPHIL # BLD: 0.08 K/UL (ref 0–0.44)
EOSINOPHILS RELATIVE PERCENT: 2 % (ref 1–4)
EPI CELLS #/AREA URNS HPF: NORMAL /HPF (ref 0–5)
ERYTHROCYTE [DISTWIDTH] IN BLOOD BY AUTOMATED COUNT: 12.1 % (ref 11.8–14.4)
GFR SERPL CREATININE-BSD FRML MDRD: >60 ML/MIN/1.73M2
GLUCOSE SERPL-MCNC: 151 MG/DL (ref 70–99)
GLUCOSE UR STRIP-MCNC: NEGATIVE MG/DL
HCT VFR BLD AUTO: 40.7 % (ref 36.3–47.1)
HGB BLD-MCNC: 13.6 G/DL (ref 11.9–15.1)
HGB UR QL STRIP.AUTO: NEGATIVE
IMM GRANULOCYTES # BLD AUTO: <0.03 K/UL (ref 0–0.3)
IMM GRANULOCYTES NFR BLD: 0 %
KETONES UR STRIP-MCNC: NEGATIVE MG/DL
LEUKOCYTE ESTERASE UR QL STRIP: ABNORMAL
LYMPHOCYTES NFR BLD: 1.74 K/UL (ref 1.1–3.7)
LYMPHOCYTES RELATIVE PERCENT: 33 % (ref 24–43)
MCH RBC QN AUTO: 29.9 PG (ref 25.2–33.5)
MCHC RBC AUTO-ENTMCNC: 33.4 G/DL (ref 28.4–34.8)
MCV RBC AUTO: 89.5 FL (ref 82.6–102.9)
MONOCYTES NFR BLD: 0.45 K/UL (ref 0.1–1.2)
MONOCYTES NFR BLD: 8 % (ref 3–12)
NEUTROPHILS NFR BLD: 56 % (ref 36–65)
NEUTS SEG NFR BLD: 2.98 K/UL (ref 1.5–8.1)
NITRITE UR QL STRIP: NEGATIVE
NRBC BLD-RTO: 0 PER 100 WBC
PH UR STRIP: 7 [PH] (ref 5–8)
PLATELET # BLD AUTO: 230 K/UL (ref 138–453)
PMV BLD AUTO: 10.6 FL (ref 8.1–13.5)
POTASSIUM SERPL-SCNC: 3.7 MMOL/L (ref 3.7–5.3)
PROT UR STRIP-MCNC: NEGATIVE MG/DL
RBC # BLD AUTO: 4.55 M/UL (ref 3.95–5.11)
RBC #/AREA URNS HPF: NORMAL /HPF (ref 0–4)
SODIUM SERPL-SCNC: 137 MMOL/L (ref 135–144)
SP GR UR STRIP: 1.02 (ref 1–1.03)
UROBILINOGEN UR STRIP-ACNC: NORMAL EU/DL (ref 0–1)
WBC #/AREA URNS HPF: NORMAL /HPF (ref 0–5)
WBC OTHER # BLD: 5.3 K/UL (ref 3.5–11.3)

## 2023-09-14 PROCEDURE — 99284 EMERGENCY DEPT VISIT MOD MDM: CPT | Performed by: EMERGENCY MEDICINE

## 2023-09-14 PROCEDURE — 74176 CT ABD & PELVIS W/O CONTRAST: CPT

## 2023-09-14 PROCEDURE — 85025 COMPLETE CBC W/AUTO DIFF WBC: CPT

## 2023-09-14 PROCEDURE — 96375 TX/PRO/DX INJ NEW DRUG ADDON: CPT | Performed by: EMERGENCY MEDICINE

## 2023-09-14 PROCEDURE — 96374 THER/PROPH/DIAG INJ IV PUSH: CPT | Performed by: EMERGENCY MEDICINE

## 2023-09-14 PROCEDURE — 80048 BASIC METABOLIC PNL TOTAL CA: CPT

## 2023-09-14 PROCEDURE — 6360000002 HC RX W HCPCS: Performed by: STUDENT IN AN ORGANIZED HEALTH CARE EDUCATION/TRAINING PROGRAM

## 2023-09-14 PROCEDURE — 81001 URINALYSIS AUTO W/SCOPE: CPT

## 2023-09-14 RX ORDER — CYCLOBENZAPRINE HCL 5 MG
5 TABLET ORAL 2 TIMES DAILY PRN
Qty: 10 TABLET | Refills: 0 | Status: SHIPPED | OUTPATIENT
Start: 2023-09-14 | End: 2023-09-24

## 2023-09-14 RX ORDER — FENTANYL CITRATE 50 UG/ML
50 INJECTION, SOLUTION INTRAMUSCULAR; INTRAVENOUS ONCE
Status: COMPLETED | OUTPATIENT
Start: 2023-09-14 | End: 2023-09-14

## 2023-09-14 RX ORDER — ONDANSETRON 2 MG/ML
4 INJECTION INTRAMUSCULAR; INTRAVENOUS ONCE
Status: COMPLETED | OUTPATIENT
Start: 2023-09-14 | End: 2023-09-14

## 2023-09-14 RX ADMIN — FENTANYL CITRATE 50 MCG: 50 INJECTION INTRAMUSCULAR; INTRAVENOUS at 15:23

## 2023-09-14 RX ADMIN — ONDANSETRON 4 MG: 2 INJECTION INTRAMUSCULAR; INTRAVENOUS at 15:23

## 2023-09-14 ASSESSMENT — PAIN DESCRIPTION - ORIENTATION
ORIENTATION: RIGHT
ORIENTATION: RIGHT

## 2023-09-14 ASSESSMENT — ENCOUNTER SYMPTOMS
VOMITING: 0
NAUSEA: 1
ABDOMINAL PAIN: 0

## 2023-09-14 ASSESSMENT — PAIN - FUNCTIONAL ASSESSMENT: PAIN_FUNCTIONAL_ASSESSMENT: 0-10

## 2023-09-14 ASSESSMENT — PAIN SCALES - GENERAL
PAINLEVEL_OUTOF10: 7
PAINLEVEL_OUTOF10: 7

## 2023-09-14 ASSESSMENT — PAIN DESCRIPTION - LOCATION
LOCATION: BACK
LOCATION: BACK

## 2023-09-14 NOTE — ED PROVIDER NOTES
[facility]  Emergency Department  Emergency Medicine Resident Sign-out     Care of Shannan Todd was assumed from Dr. Siobhan Knowles and is being seen for Flank Pain (R side)  . The patient's initial evaluation and plan have been discussed with the prior provider who initially evaluated the patient.      EMERGENCY DEPARTMENT COURSE / MEDICAL DECISION MAKING:       MEDICATIONS GIVEN:  Orders Placed This Encounter   Medications    fentaNYL (SUBLIMAZE) injection 50 mcg    ondansetron (ZOFRAN) injection 4 mg    cyclobenzaprine (FLEXERIL) 5 MG tablet     Sig: Take 1 tablet by mouth 2 times daily as needed for Muscle spasms     Dispense:  10 tablet     Refill:  0       LABS / RADIOLOGY:     Results for orders placed or performed during the hospital encounter of 09/14/23   CBC with Auto Differential   Result Value Ref Range    WBC 5.3 3.5 - 11.3 k/uL    RBC 4.55 3.95 - 5.11 m/uL    Hemoglobin 13.6 11.9 - 15.1 g/dL    Hematocrit 40.7 36.3 - 47.1 %    MCV 89.5 82.6 - 102.9 fL    MCH 29.9 25.2 - 33.5 pg    MCHC 33.4 28.4 - 34.8 g/dL    RDW 12.1 11.8 - 14.4 %    Platelets 062 952 - 436 k/uL    MPV 10.6 8.1 - 13.5 fL    NRBC Automated 0.0 0.0 per 100 WBC    Neutrophils % 56 36 - 65 %    Lymphocytes % 33 24 - 43 %    Monocytes % 8 3 - 12 %    Eosinophils % 2 1 - 4 %    Basophils % 1 0 - 2 %    Immature Granulocytes 0 0 %    Neutrophils Absolute 2.98 1.50 - 8.10 k/uL    Lymphocytes Absolute 1.74 1.10 - 3.70 k/uL    Monocytes Absolute 0.45 0.10 - 1.20 k/uL    Eosinophils Absolute 0.08 0.00 - 0.44 k/uL    Basophils Absolute 0.07 0.00 - 0.20 k/uL    Absolute Immature Granulocyte <0.03 0.00 - 0.30 k/uL   Basic Metabolic Panel w/ Reflex to MG   Result Value Ref Range    Sodium 137 135 - 144 mmol/L    Potassium 3.7 3.7 - 5.3 mmol/L    Chloride 102 98 - 107 mmol/L    CO2 23 20 - 31 mmol/L    Anion Gap 12 9 - 17 mmol/L    Glucose 151 (H) 70 - 99 mg/dL    BUN 10 6 - 20 mg/dL    Creatinine 0.6 0.5 - 0.9 mg/dL    Est,

## 2023-09-14 NOTE — ED PROVIDER NOTES
[address] [place] [address] ED  Emergency Department Encounter  Emergency Medicine Resident     Pt Chele Shell Route  MRN: [id number]  [address] [facility] [address]oulaye 1970  Date of evaluation: 9/14/23  PCP:  Yajaira Trejo PA-C  Note Started: 2:41 PM EDT      CHIEF COMPLAINT       Chief Complaint   Patient presents with    Flank Pain     R side       HISTORY OF PRESENT ILLNESS  (Location/Symptom, Timing/Onset, Context/Setting, Quality, Duration, Modifying Factors, Severity.)      Kiera Goetz is a 45 y.o. female with history of kidney stones, hypertension, MS, anxiety and depression who presents with right flank pain. Burning with urination, increased frequency, nausea, fevers at home. Admitted last month for pyelonephritis and received IV antibiotics, which helped but eventually symptoms  returned and worsened. Patient has an appointment with her PCP on September 25 however is unable to wait due to the pain. She required prior surgical removal of kidney stones in the past.      PAST MEDICAL / SURGICAL / SOCIAL / FAMILY HISTORY      has a past medical history of Anxiety, Depression, Hypertension, Kidney stones, Multiple sclerosis ([address]), and Osteoporosis. has a past surgical history that includes Hysterectomy (01/01/2005); Tonsillectomy and adenoidectomy (01/01/1978); Cholecystectomy (01/01/2000); Appendectomy (01/01/2001); Kidney stone surgery (01/01/2014); Esophagogastroduodenoscopy; and Upper gastrointestinal endoscopy (N/A, 1/9/2023).       Social History     Socioeconomic History    Marital status: Unknown     Spouse name: Not on file    Number of children: Not on file    Years of education: Not on file    Highest education level: Not on file   Occupational History    Not on file   Tobacco Use    Smoking status: Passive Smoke Exposure - Never Smoker    Smokeless tobacco: Never   Substance and Sexual Activity    Alcohol use: No    Drug use: No    Sexual activity: Never   Other Topics Concern    Not on file

## 2023-09-14 NOTE — DISCHARGE INSTRUCTIONS
You were evaluated the emergency department for right flank pain. Your lab work did not reveal any acute abnormalities. Your urinalysis did not reveal urinary tract infection. CT of your abdomen pelvis did not reveal a kidney stone or infection of your kidney. CT of your abdomen did show adenomas on your adrenal glands. This was discussed with you and you stated that you already knew about this. Please follow-up with your primary care physician at your previously scheduled appointment. Please return to the emergency department for any worsening symptoms, questions or concerns. You were given a prescription for Flexeril. This is a muscle relaxer. Do not operate heavy machinery including driving a vehicle while taking this medication.

## 2023-11-27 ENCOUNTER — APPOINTMENT (OUTPATIENT)
Dept: GENERAL RADIOLOGY | Age: 53
End: 2023-11-27
Payer: MEDICARE

## 2023-11-27 ENCOUNTER — HOSPITAL ENCOUNTER (OUTPATIENT)
Age: 53
Setting detail: OBSERVATION
Discharge: HOME | End: 2023-11-28
Attending: EMERGENCY MEDICINE | Admitting: EMERGENCY MEDICINE
Payer: MEDICARE

## 2023-11-27 DIAGNOSIS — T50.905A ADVERSE EFFECT OF DRUG, INITIAL ENCOUNTER: Primary | ICD-10-CM

## 2023-11-27 LAB
ALBUMIN SERPL-MCNC: 4 G/DL (ref 3.5–5.2)
ALBUMIN/GLOB SERPL: 1.5 {RATIO} (ref 1–2.5)
ALP SERPL-CCNC: 105 U/L (ref 35–104)
ALT SERPL-CCNC: 24 U/L (ref 5–33)
ANION GAP SERPL CALCULATED.3IONS-SCNC: 11 MMOL/L (ref 9–17)
AST SERPL-CCNC: 22 U/L
BASOPHILS # BLD: 0.06 K/UL (ref 0–0.2)
BASOPHILS NFR BLD: 1 % (ref 0–2)
BILIRUB SERPL-MCNC: 0.4 MG/DL (ref 0.3–1.2)
BNP SERPL-MCNC: 88 PG/ML
BUN SERPL-MCNC: 12 MG/DL (ref 6–20)
CALCIUM SERPL-MCNC: 9.2 MG/DL (ref 8.6–10.4)
CHLORIDE SERPL-SCNC: 104 MMOL/L (ref 98–107)
CO2 SERPL-SCNC: 26 MMOL/L (ref 20–31)
CREAT SERPL-MCNC: 0.6 MG/DL (ref 0.5–0.9)
D DIMER PPP FEU-MCNC: 0.33 UG/ML FEU (ref 0–0.57)
EOSINOPHIL # BLD: 0.14 K/UL (ref 0–0.44)
EOSINOPHILS RELATIVE PERCENT: 2 % (ref 1–4)
ERYTHROCYTE [DISTWIDTH] IN BLOOD BY AUTOMATED COUNT: 12.5 % (ref 11.8–14.4)
FLUAV AG SPEC QL: NEGATIVE
FLUBV AG SPEC QL: NEGATIVE
GFR SERPL CREATININE-BSD FRML MDRD: >60 ML/MIN/1.73M2
GLUCOSE SERPL-MCNC: 149 MG/DL (ref 70–99)
HCT VFR BLD AUTO: 39 % (ref 36.3–47.1)
HGB BLD-MCNC: 13.1 G/DL (ref 11.9–15.1)
IMM GRANULOCYTES # BLD AUTO: <0.03 K/UL (ref 0–0.3)
IMM GRANULOCYTES NFR BLD: 0 %
LYMPHOCYTES NFR BLD: 1.95 K/UL (ref 1.1–3.7)
LYMPHOCYTES RELATIVE PERCENT: 34 % (ref 24–43)
MCH RBC QN AUTO: 30.3 PG (ref 25.2–33.5)
MCHC RBC AUTO-ENTMCNC: 33.6 G/DL (ref 28.4–34.8)
MCV RBC AUTO: 90.3 FL (ref 82.6–102.9)
MONOCYTES NFR BLD: 0.46 K/UL (ref 0.1–1.2)
MONOCYTES NFR BLD: 8 % (ref 3–12)
NEUTROPHILS NFR BLD: 55 % (ref 36–65)
NEUTS SEG NFR BLD: 3.19 K/UL (ref 1.5–8.1)
NRBC BLD-RTO: 0 PER 100 WBC
PLATELET # BLD AUTO: 212 K/UL (ref 138–453)
PMV BLD AUTO: 10.6 FL (ref 8.1–13.5)
POTASSIUM SERPL-SCNC: 3.7 MMOL/L (ref 3.7–5.3)
PROT SERPL-MCNC: 6.6 G/DL (ref 6.4–8.3)
RBC # BLD AUTO: 4.32 M/UL (ref 3.95–5.11)
SARS-COV-2 RDRP RESP QL NAA+PROBE: NOT DETECTED
SODIUM SERPL-SCNC: 141 MMOL/L (ref 135–144)
SPECIMEN DESCRIPTION: NORMAL
TROPONIN I SERPL HS-MCNC: 7 NG/L (ref 0–14)
TROPONIN I SERPL HS-MCNC: 7 NG/L (ref 0–14)
WBC OTHER # BLD: 5.8 K/UL (ref 3.5–11.3)

## 2023-11-27 PROCEDURE — 84484 ASSAY OF TROPONIN QUANT: CPT

## 2023-11-27 PROCEDURE — 99285 EMERGENCY DEPT VISIT HI MDM: CPT

## 2023-11-27 PROCEDURE — G0378 HOSPITAL OBSERVATION PER HR: HCPCS

## 2023-11-27 PROCEDURE — 85025 COMPLETE CBC W/AUTO DIFF WBC: CPT

## 2023-11-27 PROCEDURE — 93005 ELECTROCARDIOGRAM TRACING: CPT

## 2023-11-27 PROCEDURE — 80053 COMPREHEN METABOLIC PANEL: CPT

## 2023-11-27 PROCEDURE — 87635 SARS-COV-2 COVID-19 AMP PRB: CPT

## 2023-11-27 PROCEDURE — 96375 TX/PRO/DX INJ NEW DRUG ADDON: CPT

## 2023-11-27 PROCEDURE — 6370000000 HC RX 637 (ALT 250 FOR IP)

## 2023-11-27 PROCEDURE — 85379 FIBRIN DEGRADATION QUANT: CPT

## 2023-11-27 PROCEDURE — 6360000002 HC RX W HCPCS

## 2023-11-27 PROCEDURE — 2580000003 HC RX 258

## 2023-11-27 PROCEDURE — 87804 INFLUENZA ASSAY W/OPTIC: CPT

## 2023-11-27 PROCEDURE — 83880 ASSAY OF NATRIURETIC PEPTIDE: CPT

## 2023-11-27 PROCEDURE — 71046 X-RAY EXAM CHEST 2 VIEWS: CPT

## 2023-11-27 PROCEDURE — 96374 THER/PROPH/DIAG INJ IV PUSH: CPT

## 2023-11-27 RX ORDER — ATORVASTATIN CALCIUM 20 MG/1
20 TABLET, FILM COATED ORAL NIGHTLY
COMMUNITY

## 2023-11-27 RX ORDER — ASPIRIN 81 MG/1
81 TABLET ORAL DAILY
COMMUNITY

## 2023-11-27 RX ORDER — DEXAMETHASONE SODIUM PHOSPHATE 10 MG/ML
10 INJECTION, SOLUTION INTRAMUSCULAR; INTRAVENOUS ONCE
Status: COMPLETED | OUTPATIENT
Start: 2023-11-27 | End: 2023-11-27

## 2023-11-27 RX ORDER — HYDROCODONE BITARTRATE AND ACETAMINOPHEN 5; 325 MG/1; MG/1
1 TABLET ORAL EVERY 6 HOURS PRN
COMMUNITY

## 2023-11-27 RX ORDER — ACETAMINOPHEN 325 MG/1
650 TABLET ORAL EVERY 6 HOURS PRN
Status: DISCONTINUED | OUTPATIENT
Start: 2023-11-27 | End: 2023-11-28 | Stop reason: HOSPADM

## 2023-11-27 RX ORDER — SODIUM CHLORIDE 0.9 % (FLUSH) 0.9 %
5-40 SYRINGE (ML) INJECTION PRN
Status: DISCONTINUED | OUTPATIENT
Start: 2023-11-27 | End: 2023-11-28 | Stop reason: HOSPADM

## 2023-11-27 RX ORDER — POTASSIUM CHLORIDE 7.45 MG/ML
10 INJECTION INTRAVENOUS PRN
Status: DISCONTINUED | OUTPATIENT
Start: 2023-11-27 | End: 2023-11-28 | Stop reason: HOSPADM

## 2023-11-27 RX ORDER — ENOXAPARIN SODIUM 100 MG/ML
40 INJECTION SUBCUTANEOUS DAILY
Status: DISCONTINUED | OUTPATIENT
Start: 2023-11-28 | End: 2023-11-28 | Stop reason: HOSPADM

## 2023-11-27 RX ORDER — SODIUM CHLORIDE 0.9 % (FLUSH) 0.9 %
5-40 SYRINGE (ML) INJECTION EVERY 12 HOURS SCHEDULED
Status: DISCONTINUED | OUTPATIENT
Start: 2023-11-27 | End: 2023-11-28 | Stop reason: HOSPADM

## 2023-11-27 RX ORDER — ASPIRIN 81 MG/1
81 TABLET ORAL DAILY
Status: DISCONTINUED | OUTPATIENT
Start: 2023-11-28 | End: 2023-11-28 | Stop reason: HOSPADM

## 2023-11-27 RX ORDER — ONDANSETRON 2 MG/ML
4 INJECTION INTRAMUSCULAR; INTRAVENOUS ONCE
Status: COMPLETED | OUTPATIENT
Start: 2023-11-27 | End: 2023-11-27

## 2023-11-27 RX ORDER — FAMOTIDINE 20 MG/1
20 TABLET, FILM COATED ORAL ONCE
Status: COMPLETED | OUTPATIENT
Start: 2023-11-27 | End: 2023-11-27

## 2023-11-27 RX ORDER — POTASSIUM CHLORIDE 20 MEQ/1
40 TABLET, EXTENDED RELEASE ORAL PRN
Status: DISCONTINUED | OUTPATIENT
Start: 2023-11-27 | End: 2023-11-28 | Stop reason: HOSPADM

## 2023-11-27 RX ORDER — SPIRONOLACTONE 25 MG/1
25 TABLET ORAL DAILY
Status: DISCONTINUED | OUTPATIENT
Start: 2023-11-28 | End: 2023-11-28 | Stop reason: HOSPADM

## 2023-11-27 RX ORDER — ACETAMINOPHEN 650 MG/1
650 SUPPOSITORY RECTAL EVERY 6 HOURS PRN
Status: DISCONTINUED | OUTPATIENT
Start: 2023-11-27 | End: 2023-11-28 | Stop reason: HOSPADM

## 2023-11-27 RX ORDER — POLYETHYLENE GLYCOL 3350 17 G/17G
17 POWDER, FOR SOLUTION ORAL DAILY PRN
Status: DISCONTINUED | OUTPATIENT
Start: 2023-11-27 | End: 2023-11-28 | Stop reason: HOSPADM

## 2023-11-27 RX ORDER — HYDROCODONE BITARTRATE AND ACETAMINOPHEN 5; 325 MG/1; MG/1
1 TABLET ORAL EVERY 6 HOURS PRN
Status: DISCONTINUED | OUTPATIENT
Start: 2023-11-27 | End: 2023-11-28 | Stop reason: HOSPADM

## 2023-11-27 RX ORDER — ESCITALOPRAM OXALATE 10 MG/1
20 TABLET ORAL NIGHTLY
Status: DISCONTINUED | OUTPATIENT
Start: 2023-11-27 | End: 2023-11-28 | Stop reason: HOSPADM

## 2023-11-27 RX ORDER — ESCITALOPRAM OXALATE 10 MG/1
20 TABLET ORAL DAILY
Status: DISCONTINUED | OUTPATIENT
Start: 2023-11-27 | End: 2023-11-27

## 2023-11-27 RX ORDER — SODIUM CHLORIDE 9 MG/ML
INJECTION, SOLUTION INTRAVENOUS PRN
Status: DISCONTINUED | OUTPATIENT
Start: 2023-11-27 | End: 2023-11-28 | Stop reason: HOSPADM

## 2023-11-27 RX ORDER — ONDANSETRON 2 MG/ML
4 INJECTION INTRAMUSCULAR; INTRAVENOUS EVERY 4 HOURS PRN
Status: DISCONTINUED | OUTPATIENT
Start: 2023-11-27 | End: 2023-11-28 | Stop reason: HOSPADM

## 2023-11-27 RX ORDER — DIPHENHYDRAMINE HCL 25 MG
50 TABLET ORAL ONCE
Status: COMPLETED | OUTPATIENT
Start: 2023-11-27 | End: 2023-11-27

## 2023-11-27 RX ORDER — ESCITALOPRAM OXALATE 10 MG/1
20 TABLET ORAL DAILY
Status: DISCONTINUED | OUTPATIENT
Start: 2023-11-28 | End: 2023-11-27

## 2023-11-27 RX ORDER — ATORVASTATIN CALCIUM 20 MG/1
20 TABLET, FILM COATED ORAL NIGHTLY
Status: DISCONTINUED | OUTPATIENT
Start: 2023-11-27 | End: 2023-11-28 | Stop reason: HOSPADM

## 2023-11-27 RX ADMIN — DIPHENHYDRAMINE HYDROCHLORIDE 50 MG: 25 TABLET ORAL at 17:16

## 2023-11-27 RX ADMIN — SODIUM CHLORIDE, PRESERVATIVE FREE 10 ML: 5 INJECTION INTRAVENOUS at 22:44

## 2023-11-27 RX ADMIN — DEXAMETHASONE SODIUM PHOSPHATE 10 MG: 10 INJECTION, SOLUTION INTRAMUSCULAR; INTRAVENOUS at 17:17

## 2023-11-27 RX ADMIN — ONDANSETRON 4 MG: 2 INJECTION INTRAMUSCULAR; INTRAVENOUS at 16:27

## 2023-11-27 RX ADMIN — ATORVASTATIN CALCIUM 20 MG: 20 TABLET, FILM COATED ORAL at 22:44

## 2023-11-27 RX ADMIN — ESCITALOPRAM 20 MG: 10 TABLET, FILM COATED ORAL at 23:38

## 2023-11-27 RX ADMIN — FAMOTIDINE 20 MG: 20 TABLET, FILM COATED ORAL at 17:16

## 2023-11-27 RX ADMIN — HYDROCODONE BITARTRATE AND ACETAMINOPHEN 1 TABLET: 5; 325 TABLET ORAL at 22:44

## 2023-11-27 ASSESSMENT — PAIN SCALES - GENERAL
PAINLEVEL_OUTOF10: 6

## 2023-11-27 ASSESSMENT — ENCOUNTER SYMPTOMS
VOMITING: 0
SHORTNESS OF BREATH: 1
CHEST TIGHTNESS: 0
ABDOMINAL PAIN: 0
ABDOMINAL DISTENTION: 0
WHEEZING: 0
NAUSEA: 0

## 2023-11-27 ASSESSMENT — PAIN DESCRIPTION - ORIENTATION: ORIENTATION: LEFT

## 2023-11-27 ASSESSMENT — PAIN DESCRIPTION - LOCATION: LOCATION: ARM;NECK

## 2023-11-27 ASSESSMENT — PAIN DESCRIPTION - DESCRIPTORS: DESCRIPTORS: ACHING;DISCOMFORT

## 2023-11-28 VITALS
DIASTOLIC BLOOD PRESSURE: 74 MMHG | BODY MASS INDEX: 31.48 KG/M2 | HEART RATE: 69 BPM | RESPIRATION RATE: 18 BRPM | SYSTOLIC BLOOD PRESSURE: 116 MMHG | WEIGHT: 207.01 LBS | OXYGEN SATURATION: 98 % | TEMPERATURE: 97.9 F

## 2023-11-28 LAB
EKG ATRIAL RATE: 56 BPM
EKG ATRIAL RATE: 70 BPM
EKG P AXIS: 10 DEGREES
EKG P AXIS: 30 DEGREES
EKG P-R INTERVAL: 132 MS
EKG P-R INTERVAL: 144 MS
EKG Q-T INTERVAL: 380 MS
EKG Q-T INTERVAL: 424 MS
EKG QRS DURATION: 106 MS
EKG QRS DURATION: 108 MS
EKG QTC CALCULATION (BAZETT): 409 MS
EKG QTC CALCULATION (BAZETT): 410 MS
EKG R AXIS: -8 DEGREES
EKG R AXIS: 7 DEGREES
EKG T AXIS: 31 DEGREES
EKG T AXIS: 43 DEGREES
EKG VENTRICULAR RATE: 56 BPM
EKG VENTRICULAR RATE: 70 BPM

## 2023-11-28 PROCEDURE — 93005 ELECTROCARDIOGRAM TRACING: CPT | Performed by: EMERGENCY MEDICINE

## 2023-11-28 PROCEDURE — 93010 ELECTROCARDIOGRAM REPORT: CPT | Performed by: INTERNAL MEDICINE

## 2023-11-28 PROCEDURE — G0378 HOSPITAL OBSERVATION PER HR: HCPCS

## 2023-11-28 PROCEDURE — 2580000003 HC RX 258

## 2023-11-28 PROCEDURE — 99223 1ST HOSP IP/OBS HIGH 75: CPT | Performed by: INTERNAL MEDICINE

## 2023-11-28 PROCEDURE — 6370000000 HC RX 637 (ALT 250 FOR IP): Performed by: INTERNAL MEDICINE

## 2023-11-28 PROCEDURE — 6360000002 HC RX W HCPCS

## 2023-11-28 PROCEDURE — 6370000000 HC RX 637 (ALT 250 FOR IP)

## 2023-11-28 PROCEDURE — 96372 THER/PROPH/DIAG INJ SC/IM: CPT

## 2023-11-28 RX ORDER — AMLODIPINE BESYLATE 10 MG/1
10 TABLET ORAL DAILY
Qty: 30 TABLET | Refills: 3 | Status: SHIPPED | OUTPATIENT
Start: 2023-11-29

## 2023-11-28 RX ORDER — AMLODIPINE BESYLATE 10 MG/1
10 TABLET ORAL DAILY
Status: DISCONTINUED | OUTPATIENT
Start: 2023-11-28 | End: 2023-11-28 | Stop reason: HOSPADM

## 2023-11-28 RX ADMIN — SODIUM CHLORIDE, PRESERVATIVE FREE 10 ML: 5 INJECTION INTRAVENOUS at 08:50

## 2023-11-28 RX ADMIN — ENOXAPARIN SODIUM 40 MG: 100 INJECTION SUBCUTANEOUS at 08:51

## 2023-11-28 RX ADMIN — AMLODIPINE BESYLATE 10 MG: 10 TABLET ORAL at 08:54

## 2023-11-28 RX ADMIN — SPIRONOLACTONE 25 MG: 25 TABLET, FILM COATED ORAL at 08:50

## 2023-11-28 RX ADMIN — ASPIRIN 81 MG: 81 TABLET, COATED ORAL at 08:50

## 2023-11-28 NOTE — H&P
[id number] [facility]  CDU / OBSERVATION eNCOUnter  Resident Note     Pt Name: Kiera Goetz  MRN: [id number]  [facility] [facility] 1970  Date of evaluation: 11/28/23  Patient's PCP is : Yajaira Trejo PA-C    CHIEF COMPLAINT       Chief Complaint   Patient presents with    Shortness of Breath    Dizziness         HISTORY OF PRESENT ILLNESS    Kiera Goetz is a 52 y.o. female PMH MS, DM, HTN, PE not on anticoagulation, CHF who presents with SOB since yesterday. Patient also with neck pain rating down right arm, generalized swelling of the face, legs and arms. Patient reports she is feeling back to baseline this morning, no longer short of breath with lying down or walking. Location/Symptom: SOB  Timing/Onset: Yesterday  Provocation: Lisinopril  Severity: Severe  Timing/Duration: Constant  Modifying Factors: Stopping lisinopril    History was obtained in part through review of the ED chart. When possible, a direct discussion was had with ED nurses, residents, and attendings. REVIEW OF SYSTEMS       General ROS - No fevers, No malaise   Ophthalmic ROS - No discharge, No changes in vision  ENT ROS -  No sore throat, No rhinorrhea,   Respiratory ROS - no shortness of breath, no cough, no  wheezing  Cardiovascular ROS - No chest pain, no dyspnea on exertion  Gastrointestinal ROS - No abdominal pain, no nausea or vomiting, no change in bowel habits, no black or bloody stools  Genito-Urinary ROS - No dysuria, trouble voiding, or hematuria  Musculoskeletal ROS - No myalgias, No arthalgias  Neurological ROS - No headache, no dizziness/lightheadedness, No focal weakness, no loss of sensation  Dermatological ROS - No lesions, No rash     (PQRS) Advance directives on face sheet per hospital policy. No change unless specifically mentioned in chart    PAST MEDICAL HISTORY    has a past medical history of Anxiety, Depression, Hypertension, Kidney stones, Multiple sclerosis ([address]), and Osteoporosis.     I have

## 2023-11-28 NOTE — DISCHARGE SUMMARY
CDU Discharge Summary        Patient:  Ryan Stafford  YOB: 1970    MRN: [id number]   Acct: [de-identified]    Primary Care Physician: Mary Gutierrez PA-C    Admit date:  11/27/2023  3:45 PM  Discharge date: 11/28/2023  1:39 PM     Discharge Diagnoses:     Acute shortness of breath, anaphylaxis due to lisinopril allergy  Improved with Decadron, Benadryl, Pepcid, discontinuing lisinopril. Follow-up:  Call today/tomorrow for a follow up appointment with Mary Gutierrez PA-C , or return to the Emergency Room with worsening symptoms    Stressed to patient the importance of following up with primary care doctor for further workup/management of symptoms. Pt verbalizes understanding and agrees with plan. Discharge Medications:  Changes to medications stop taking lisinopril          Medication List        CONTINUE taking these medications      * amLODIPine 10 MG tablet  Commonly known as: NORVASC     * amLODIPine 10 MG tablet  Commonly known as: NORVASC  Take 1 tablet by mouth daily  Start taking on: November 29, 2023     amphetamine-dextroamphetamine 10 MG tablet  Commonly known as: ADDERALL     aspirin 81 MG EC tablet     atorvastatin 20 MG tablet  Commonly known as: LIPITOR     escitalopram 20 MG tablet  Commonly known as: LEXAPRO     HYDROcodone-acetaminophen 5-325 MG per tablet  Commonly known as: NORCO     spironolactone 25 MG tablet  Commonly known as: ALDACTONE  Take 1 tablet by mouth daily           * This list has 2 medication(s) that are the same as other medications prescribed for you. Read the directions carefully, and ask your doctor or other care provider to review them with you.                 STOP taking these medications      acetaminophen 500 MG tablet  Commonly known as: TYLENOL     metFORMIN 500 MG tablet  Commonly known as: GLUCOPHAGE     ondansetron 4 MG disintegrating tablet  Commonly known as: Zofran ODT               Where to Get Your Medications        These

## 2023-11-28 NOTE — DISCHARGE INSTRUCTIONS
You were seen for shortness of breath. It was determined that this was due an allergic reaction to lisinopril. Do not take lisinopril. Follow-up with your PCP in 1 week. Take your medications as prescribed    Return to the emergency department if you experience shortness of breath, chest pain, fever, chills or any other concern.

## 2024-07-14 PROBLEM — R63.4 ABNORMAL WEIGHT LOSS: Status: ACTIVE | Noted: 2024-07-14

## 2024-07-22 ENCOUNTER — HOSPITAL ENCOUNTER (OUTPATIENT)
Dept: NUCLEAR MEDICINE | Age: 54
Discharge: HOME | End: 2024-07-24
Attending: INTERNAL MEDICINE
Payer: MEDICARE

## 2024-07-22 ENCOUNTER — HOSPITAL ENCOUNTER (OUTPATIENT)
Age: 54
Discharge: HOME | End: 2024-07-22
Payer: MEDICARE

## 2024-07-22 ENCOUNTER — HOSPITAL ENCOUNTER (OUTPATIENT)
Age: 54
Discharge: HOME | End: 2024-07-24
Payer: MEDICARE

## 2024-07-22 ENCOUNTER — HOSPITAL ENCOUNTER (OUTPATIENT)
Dept: GENERAL RADIOLOGY | Age: 54
Discharge: HOME | End: 2024-07-24
Payer: MEDICARE

## 2024-07-22 DIAGNOSIS — M81.8 OSTEOPOROSIS, IDIOPATHIC: ICD-10-CM

## 2024-07-22 DIAGNOSIS — E55.9 VITAMIN D DEFICIENCY: ICD-10-CM

## 2024-07-22 DIAGNOSIS — E21.3 HYPERPARATHYROIDISM (HCC): ICD-10-CM

## 2024-07-22 DIAGNOSIS — E83.51 HYPOCALCEMIA: ICD-10-CM

## 2024-07-22 LAB
25(OH)D3 SERPL-MCNC: 43.2 NG/ML (ref 30–100)
CALCIUM SERPL-MCNC: 9.8 MG/DL (ref 8.6–10.4)
CREAT SERPL-MCNC: 0.7 MG/DL (ref 0.5–0.9)
CREAT UR-MCNC: 143 MG/DL (ref 28–217)
ESTRADIOL LEVEL: 7 PG/ML
FSH SERPL-ACNC: 26.4 MIU/ML
GFR, ESTIMATED: >90 ML/MIN/1.73M2
LH SERPL-ACNC: 8.8 MIU/ML (ref 1.7–8.6)
MICROALBUMIN UR-MCNC: 43 MG/L (ref 0–20)
MICROALBUMIN/CREAT UR-RTO: 30 MCG/MG CREAT (ref 0–25)
OSMOLALITY UR: 613 MOSM/KG (ref 80–1300)
PROLACTIN SERPL-MCNC: 6.4 NG/ML (ref 4.79–23.3)
PTH-INTACT SERPL-MCNC: 87 PG/ML (ref 15–65)
SODIUM SERPL-SCNC: 140 MMOL/L (ref 136–145)
T3FREE SERPL-MCNC: 3.5 PG/ML (ref 2–4.4)
T4 FREE SERPL-MCNC: 1.4 NG/DL (ref 0.92–1.68)
TESTOST SERPL-MCNC: 18 NG/DL (ref 3–41)
TSH SERPL DL<=0.05 MIU/L-ACNC: 1.89 UIU/ML (ref 0.27–4.2)

## 2024-07-22 PROCEDURE — 84403 ASSAY OF TOTAL TESTOSTERONE: CPT

## 2024-07-22 PROCEDURE — 83970 ASSAY OF PARATHORMONE: CPT

## 2024-07-22 PROCEDURE — 83935 ASSAY OF URINE OSMOLALITY: CPT

## 2024-07-22 PROCEDURE — 72070 X-RAY EXAM THORAC SPINE 2VWS: CPT

## 2024-07-22 PROCEDURE — A9500 TC99M SESTAMIBI: HCPCS | Performed by: INTERNAL MEDICINE

## 2024-07-22 PROCEDURE — 83001 ASSAY OF GONADOTROPIN (FSH): CPT

## 2024-07-22 PROCEDURE — 72100 X-RAY EXAM L-S SPINE 2/3 VWS: CPT

## 2024-07-22 PROCEDURE — 82670 ASSAY OF TOTAL ESTRADIOL: CPT

## 2024-07-22 PROCEDURE — 36415 COLL VENOUS BLD VENIPUNCTURE: CPT

## 2024-07-22 PROCEDURE — 82043 UR ALBUMIN QUANTITATIVE: CPT

## 2024-07-22 PROCEDURE — 82565 ASSAY OF CREATININE: CPT

## 2024-07-22 PROCEDURE — 84146 ASSAY OF PROLACTIN: CPT

## 2024-07-22 PROCEDURE — 82306 VITAMIN D 25 HYDROXY: CPT

## 2024-07-22 PROCEDURE — 78072 PARATHYRD PLANAR W/SPECT&CT: CPT

## 2024-07-22 PROCEDURE — 83002 ASSAY OF GONADOTROPIN (LH): CPT

## 2024-07-22 PROCEDURE — 84295 ASSAY OF SERUM SODIUM: CPT

## 2024-07-22 PROCEDURE — 84443 ASSAY THYROID STIM HORMONE: CPT

## 2024-07-22 PROCEDURE — 82310 ASSAY OF CALCIUM: CPT

## 2024-07-22 PROCEDURE — 84439 ASSAY OF FREE THYROXINE: CPT

## 2024-07-22 PROCEDURE — 82523 COLLAGEN CROSSLINKS: CPT

## 2024-07-22 PROCEDURE — 82570 ASSAY OF URINE CREATININE: CPT

## 2024-07-22 PROCEDURE — 84481 FREE ASSAY (FT-3): CPT

## 2024-07-22 PROCEDURE — 86376 MICROSOMAL ANTIBODY EACH: CPT

## 2024-07-22 PROCEDURE — 3430000000 HC RX DIAGNOSTIC RADIOPHARMACEUTICAL: Performed by: INTERNAL MEDICINE

## 2024-07-22 RX ORDER — TETRAKIS(2-METHOXYISOBUTYLISOCYANIDE)COPPER(I) TETRAFLUOROBORATE 1 MG/ML
18.7 INJECTION, POWDER, LYOPHILIZED, FOR SOLUTION INTRAVENOUS
Status: COMPLETED | OUTPATIENT
Start: 2024-07-22 | End: 2024-07-22

## 2024-07-22 RX ADMIN — TETRAKIS(2-METHOXYISOBUTYLISOCYANIDE)COPPER(I) TETRAFLUOROBORATE 18.7 MILLICURIE: 1 INJECTION, POWDER, LYOPHILIZED, FOR SOLUTION INTRAVENOUS at 09:20

## 2024-07-23 ENCOUNTER — TELEPHONE (OUTPATIENT)
Dept: NEUROLOGY | Age: 54
End: 2024-07-23

## 2024-07-23 ENCOUNTER — HOSPITAL ENCOUNTER (OUTPATIENT)
Age: 54
Discharge: HOME | End: 2024-07-23
Payer: MEDICARE

## 2024-07-23 ENCOUNTER — HOSPITAL ENCOUNTER (OUTPATIENT)
Age: 54
Setting detail: SPECIMEN
Discharge: HOME | End: 2024-07-23

## 2024-07-23 PROCEDURE — 82533 TOTAL CORTISOL: CPT

## 2024-07-23 NOTE — TELEPHONE ENCOUNTER
07 23 2024 called the patient times 2 (06 28 2024 and 07 15 2024 at  ) to schedule new patient appointment with one of our providers, left message on machine both times, no response.  I mailed the patient a letter asking them to call the office back to schedule this appointment.  KS

## 2024-07-24 LAB
COLLAGEN CTX SERPL-MCNC: 1227 PG/ML
THYROPEROXIDASE AB SERPL IA-ACNC: <4 IU/ML (ref 0–25)

## 2024-07-28 LAB — CORTISOL SALIVARY: <0.025 UG/DL

## 2024-07-30 LAB — STONE RISK ANALYSIS: NORMAL

## 2024-08-08 ENCOUNTER — HOSPITAL ENCOUNTER (OUTPATIENT)
Dept: MAMMOGRAPHY | Age: 54
Discharge: HOME | End: 2024-08-10
Attending: INTERNAL MEDICINE
Payer: MEDICARE

## 2024-08-08 DIAGNOSIS — E83.51 HYPOCALCEMIA: ICD-10-CM

## 2024-08-08 DIAGNOSIS — E21.3 HYPERPARATHYROIDISM (HCC): ICD-10-CM

## 2024-08-08 DIAGNOSIS — E55.9 VITAMIN D DEFICIENCY: ICD-10-CM

## 2024-08-08 PROCEDURE — 77080 DXA BONE DENSITY AXIAL: CPT

## 2024-10-04 ENCOUNTER — HOSPITAL ENCOUNTER (OUTPATIENT)
Dept: OBSERVATION | Age: 54
Discharge: HOME | End: 2024-10-04
Attending: INTERNAL MEDICINE | Admitting: INTERNAL MEDICINE
Payer: COMMERCIAL

## 2024-10-04 VITALS
SYSTOLIC BLOOD PRESSURE: 128 MMHG | HEART RATE: 109 BPM | TEMPERATURE: 98.6 F | RESPIRATION RATE: 18 BRPM | DIASTOLIC BLOOD PRESSURE: 88 MMHG | OXYGEN SATURATION: 99 %

## 2024-10-04 LAB — CORTIS SERPL-MCNC: 37.3 UG/DL (ref 2.5–19.5)

## 2024-10-04 PROCEDURE — 36415 COLL VENOUS BLD VENIPUNCTURE: CPT

## 2024-10-04 PROCEDURE — G0379 DIRECT REFER HOSPITAL OBSERV: HCPCS

## 2024-10-04 PROCEDURE — 6360000002 HC RX W HCPCS: Performed by: INTERNAL MEDICINE

## 2024-10-04 PROCEDURE — 82533 TOTAL CORTISOL: CPT

## 2024-10-04 PROCEDURE — 96374 THER/PROPH/DIAG INJ IV PUSH: CPT

## 2024-10-04 PROCEDURE — G0378 HOSPITAL OBSERVATION PER HR: HCPCS

## 2024-10-04 RX ORDER — COSYNTROPIN 0.25 MG/ML
250 INJECTION, POWDER, FOR SOLUTION INTRAMUSCULAR; INTRAVENOUS ONCE
Status: COMPLETED | OUTPATIENT
Start: 2024-10-04 | End: 2024-10-04

## 2024-10-04 RX ADMIN — COSYNTROPIN 250 MCG: 0.25 INJECTION, POWDER, LYOPHILIZED, FOR SOLUTION INTRAMUSCULAR; INTRAVENOUS at 16:14

## 2024-11-22 ENCOUNTER — APPOINTMENT (OUTPATIENT)
Dept: GENERAL RADIOLOGY | Age: 54
End: 2024-11-22
Payer: COMMERCIAL

## 2024-11-22 ENCOUNTER — HOSPITAL ENCOUNTER (EMERGENCY)
Age: 54
Discharge: HOME | End: 2024-11-22
Attending: EMERGENCY MEDICINE
Payer: COMMERCIAL

## 2024-11-22 ENCOUNTER — APPOINTMENT (OUTPATIENT)
Dept: CT IMAGING | Age: 54
End: 2024-11-22
Payer: COMMERCIAL

## 2024-11-22 VITALS
HEART RATE: 90 BPM | RESPIRATION RATE: 12 BRPM | OXYGEN SATURATION: 93 % | TEMPERATURE: 98.3 F | SYSTOLIC BLOOD PRESSURE: 119 MMHG | DIASTOLIC BLOOD PRESSURE: 74 MMHG

## 2024-11-22 DIAGNOSIS — R10.9 RIGHT FLANK PAIN: Primary | ICD-10-CM

## 2024-11-22 DIAGNOSIS — N30.00 ACUTE CYSTITIS WITHOUT HEMATURIA: ICD-10-CM

## 2024-11-22 LAB
ALBUMIN SERPL-MCNC: 4.7 G/DL (ref 3.5–5.2)
ALBUMIN/GLOB SERPL: 1.7 {RATIO} (ref 1–2.5)
ALP SERPL-CCNC: 101 U/L (ref 35–104)
ALT SERPL-CCNC: 30 U/L (ref 10–35)
ANION GAP SERPL CALCULATED.3IONS-SCNC: 14 MMOL/L (ref 9–16)
AST SERPL-CCNC: 37 U/L (ref 10–35)
BACTERIA URNS QL MICRO: ABNORMAL
BASOPHILS # BLD: 0.1 K/UL (ref 0–0.2)
BASOPHILS NFR BLD: 1 % (ref 0–2)
BILIRUB SERPL-MCNC: 1 MG/DL (ref 0–1.2)
BILIRUB UR QL STRIP: NEGATIVE
BUN SERPL-MCNC: 13 MG/DL (ref 6–20)
CALCIUM SERPL-MCNC: 10.5 MG/DL (ref 8.6–10.4)
CASTS #/AREA URNS LPF: ABNORMAL /LPF (ref 0–8)
CHLORIDE SERPL-SCNC: 104 MMOL/L (ref 98–107)
CLARITY UR: ABNORMAL
CO2 SERPL-SCNC: 21 MMOL/L (ref 20–31)
COLOR UR: YELLOW
CREAT SERPL-MCNC: 0.8 MG/DL (ref 0.6–0.9)
EKG ATRIAL RATE: 84 BPM
EKG P AXIS: 21 DEGREES
EKG P-R INTERVAL: 144 MS
EKG Q-T INTERVAL: 394 MS
EKG QRS DURATION: 104 MS
EKG QTC CALCULATION (BAZETT): 465 MS
EKG T AXIS: 35 DEGREES
EKG VENTRICULAR RATE: 84 BPM
EOSINOPHIL # BLD: 0.16 K/UL (ref 0–0.44)
EOSINOPHILS RELATIVE PERCENT: 2 % (ref 1–4)
EPI CELLS #/AREA URNS HPF: ABNORMAL /HPF (ref 0–5)
ERYTHROCYTE [DISTWIDTH] IN BLOOD BY AUTOMATED COUNT: 12.6 % (ref 11.8–14.4)
GFR, ESTIMATED: 86 ML/MIN/1.73M2
GLUCOSE SERPL-MCNC: 151 MG/DL (ref 74–99)
GLUCOSE UR STRIP-MCNC: ABNORMAL MG/DL
HCG SERPL QL: NEGATIVE
HCT VFR BLD AUTO: 44.5 % (ref 36.3–47.1)
HGB BLD-MCNC: 15.3 G/DL (ref 11.9–15.1)
HGB UR QL STRIP.AUTO: NEGATIVE
IMM GRANULOCYTES # BLD AUTO: 0.03 K/UL (ref 0–0.3)
IMM GRANULOCYTES NFR BLD: 0 %
KETONES UR STRIP-MCNC: ABNORMAL MG/DL
LEUKOCYTE ESTERASE UR QL STRIP: ABNORMAL
LIPASE SERPL-CCNC: 78 U/L (ref 13–60)
LYMPHOCYTES NFR BLD: 3.17 K/UL (ref 1.1–3.7)
LYMPHOCYTES RELATIVE PERCENT: 34 % (ref 24–43)
MCH RBC QN AUTO: 29.9 PG (ref 25.2–33.5)
MCHC RBC AUTO-ENTMCNC: 34.4 G/DL (ref 28.4–34.8)
MCV RBC AUTO: 87.1 FL (ref 82.6–102.9)
MONOCYTES NFR BLD: 0.85 K/UL (ref 0.1–1.2)
MONOCYTES NFR BLD: 9 % (ref 3–12)
NEUTROPHILS NFR BLD: 54 % (ref 36–65)
NEUTS SEG NFR BLD: 5.16 K/UL (ref 1.5–8.1)
NITRITE UR QL STRIP: NEGATIVE
NRBC BLD-RTO: 0 PER 100 WBC
PH UR STRIP: 5 [PH] (ref 5–8)
PLATELET # BLD AUTO: 274 K/UL (ref 138–453)
PMV BLD AUTO: 10.5 FL (ref 8.1–13.5)
POTASSIUM SERPL-SCNC: 3.4 MMOL/L (ref 3.7–5.3)
PROT SERPL-MCNC: 7.5 G/DL (ref 6.6–8.7)
PROT UR STRIP-MCNC: ABNORMAL MG/DL
RBC # BLD AUTO: 5.11 M/UL (ref 3.95–5.11)
RBC #/AREA URNS HPF: ABNORMAL /HPF (ref 0–4)
SODIUM SERPL-SCNC: 139 MMOL/L (ref 136–145)
SP GR UR STRIP: 1.04 (ref 1–1.03)
TROPONIN I SERPL HS-MCNC: 7 NG/L (ref 0–14)
TROPONIN I SERPL HS-MCNC: 8 NG/L (ref 0–14)
UROBILINOGEN UR STRIP-ACNC: NORMAL EU/DL (ref 0–1)
WBC #/AREA URNS HPF: ABNORMAL /HPF (ref 0–5)
WBC OTHER # BLD: 9.5 K/UL (ref 3.5–11.3)

## 2024-11-22 PROCEDURE — 87086 URINE CULTURE/COLONY COUNT: CPT

## 2024-11-22 PROCEDURE — 6360000002 HC RX W HCPCS

## 2024-11-22 PROCEDURE — 84703 CHORIONIC GONADOTROPIN ASSAY: CPT

## 2024-11-22 PROCEDURE — 83690 ASSAY OF LIPASE: CPT

## 2024-11-22 PROCEDURE — 99285 EMERGENCY DEPT VISIT HI MDM: CPT | Performed by: EMERGENCY MEDICINE

## 2024-11-22 PROCEDURE — 74176 CT ABD & PELVIS W/O CONTRAST: CPT

## 2024-11-22 PROCEDURE — 93010 ELECTROCARDIOGRAM REPORT: CPT | Performed by: INTERNAL MEDICINE

## 2024-11-22 PROCEDURE — 81001 URINALYSIS AUTO W/SCOPE: CPT

## 2024-11-22 PROCEDURE — 96374 THER/PROPH/DIAG INJ IV PUSH: CPT | Performed by: EMERGENCY MEDICINE

## 2024-11-22 PROCEDURE — 93005 ELECTROCARDIOGRAM TRACING: CPT

## 2024-11-22 PROCEDURE — 80053 COMPREHEN METABOLIC PANEL: CPT

## 2024-11-22 PROCEDURE — 84484 ASSAY OF TROPONIN QUANT: CPT

## 2024-11-22 PROCEDURE — 71046 X-RAY EXAM CHEST 2 VIEWS: CPT

## 2024-11-22 PROCEDURE — 96375 TX/PRO/DX INJ NEW DRUG ADDON: CPT | Performed by: EMERGENCY MEDICINE

## 2024-11-22 PROCEDURE — 6370000000 HC RX 637 (ALT 250 FOR IP)

## 2024-11-22 PROCEDURE — 85025 COMPLETE CBC W/AUTO DIFF WBC: CPT

## 2024-11-22 RX ORDER — ONDANSETRON 4 MG/1
4 TABLET, ORALLY DISINTEGRATING ORAL 3 TIMES DAILY PRN
Qty: 21 TABLET | Refills: 0 | Status: SHIPPED | OUTPATIENT
Start: 2024-11-22

## 2024-11-22 RX ORDER — DICYCLOMINE HYDROCHLORIDE 10 MG/1
10 CAPSULE ORAL
Qty: 120 CAPSULE | Refills: 0 | Status: SHIPPED | OUTPATIENT
Start: 2024-11-22

## 2024-11-22 RX ORDER — ONDANSETRON 2 MG/ML
4 INJECTION INTRAMUSCULAR; INTRAVENOUS ONCE
Status: COMPLETED | OUTPATIENT
Start: 2024-11-22 | End: 2024-11-22

## 2024-11-22 RX ORDER — POTASSIUM CHLORIDE 1500 MG/1
40 TABLET, EXTENDED RELEASE ORAL ONCE
Status: COMPLETED | OUTPATIENT
Start: 2024-11-22 | End: 2024-11-22

## 2024-11-22 RX ORDER — MORPHINE SULFATE 4 MG/ML
4 INJECTION, SOLUTION INTRAMUSCULAR; INTRAVENOUS ONCE
Status: COMPLETED | OUTPATIENT
Start: 2024-11-22 | End: 2024-11-22

## 2024-11-22 RX ORDER — CEPHALEXIN 500 MG/1
500 CAPSULE ORAL 2 TIMES DAILY
Qty: 14 CAPSULE | Refills: 0 | Status: SHIPPED | OUTPATIENT
Start: 2024-11-22 | End: 2024-11-29

## 2024-11-22 RX ADMIN — MORPHINE SULFATE 4 MG: 4 INJECTION INTRAVENOUS at 01:40

## 2024-11-22 RX ADMIN — ONDANSETRON 4 MG: 2 INJECTION INTRAMUSCULAR; INTRAVENOUS at 01:40

## 2024-11-22 RX ADMIN — POTASSIUM CHLORIDE 40 MEQ: 1500 TABLET, EXTENDED RELEASE ORAL at 04:01

## 2024-11-22 ASSESSMENT — ENCOUNTER SYMPTOMS
NAUSEA: 1
DIARRHEA: 1
VOMITING: 1
COUGH: 0
RECTAL PAIN: 0
ABDOMINAL PAIN: 1
SHORTNESS OF BREATH: 0
BLOOD IN STOOL: 1

## 2024-11-22 ASSESSMENT — PAIN SCALES - GENERAL: PAINLEVEL_OUTOF10: 10

## 2024-11-22 ASSESSMENT — HEART SCORE: ECG: NORMAL

## 2024-11-22 NOTE — ED NOTES
Pt presents to ED through triage with c/o R sided flank pain and diarrhea  Pt states she has been sick for a week, emesis, diarrhea and flank pain  Pt states she has extensive renal history including stones and stents  Pt denies dysuria  Pt also complains of bright red blood in stool   Pt is A&Ox4, even unlabored RR NAD  Pt connected to BP SP02 monitor  Pt IV established pt denies needs a this time

## 2024-11-22 NOTE — ED PROVIDER NOTES
[facility]   Emergency Department  Faculty Attestation       I performed a history and physical examination of the patient and discussed management with the resident. I reviewed the resident’s note and agree with the documented findings including all diagnostic interpretations and plan of care. Any areas of disagreement are noted on the chart. I was personally present for the key portions of any procedures. I have documented in the chart those procedures where I was not present during the key portions. I have reviewed the emergency nurses triage note. I agree with the chief complaint, past medical history, past surgical history, allergies, medications, social and family history as documented unless otherwise noted below.  For Physician Assistant/ Nurse Practitioner cases/documentation I have personally evaluated this patient and have completed at least one if not all key elements of the E/M (history, physical exam, and MDM). Additional findings are as noted.    Patient Name: Elizabeth Maria  MRN: [id number]  : 1970  Primary Care Physician: No primary care provider on file.    Date of evaluationa: 2024   Note Started: 1:43 AM EST    Pertinent Comments     Chief Complaint:   Chief Complaint   Patient presents with    Flank Pain    Diarrhea        Initial vitals: (If not listed, please see nursing documentation)  ED Triage Vitals [24 0128]   BP Systolic BP Percentile Diastolic BP Percentile Temp Temp Source Pulse Respirations SpO2   (!) 142/82 -- -- 98.3 °F (36.8 °C) Oral 87 18 97 %      Height Weight         -- --              HPI/PE/Impression:  This is a 54 y.o. female who presents to the Emergency Department w/ main complaint of right flank pain.  Had been having nausea vomiting and diarrhea initially.  Then developed right sided flank pain that radiates to the left bilateral flank pain.  Worse on the R.  Radiates to the RLQ.  Decreased urinary frequency.  Also rating up into  her chest the chest aching this.  Does feel like her prior kidney stones but typically they do not radiate.  On exam patient is awake and alert.  In no acute distress. Normocephalic. Atraumatic.  Heart sounds regular with no murmurs.  Talking in full sentences with no respiratory distress. Lungs clear to auscultation.  Abdomen soft , non distended, has mild right lower quadrant tenderness and right flank tenderness.  Has exquisite right lower back tenderness.  Bilateral lower extremities with no edema or asymmetry.  Alert and oriented x4 with no focal deficits.      Medical Decision Making  Nausea vomiting diarrhea as well as right flank pain.  Does radiate into the chest as well.  Concern for possible kidney stone versus musculoskeletal pain versus gastroenteritis versus foodborne illness  Will get CT abdomen pelvis without contrast, basic labs, low suspicion of ACS but will get cardiac workup given the radiation and patient is over 55 and female     Amount and/or Complexity of Data Reviewed  Labs: ordered. Decision-making details documented in ED Course.  Radiology: ordered.  ECG/medicine tests: ordered.    Risk  Prescription drug management.       EKG Interpretation    Interpreted by emergency department physician    Clinical Impression: Normal sinus rhythm at a rate of 84.  Leftward axis.  Nonspecific motion artifact, but no significant T wave or ST abnormalities    Stephy Wright MD    Heart Score:   Heart Score for chest pain patients  History: Slightly Suspicious  ECG: Normal  Patient Age: > 45 and < 65 years  *Risk factors for Atherosclerotic disease: Hypertension  Risk Factors: 1 or 2 risk factors  Troponin: < 1X normal limit  Heart Score Total: 2    Score 0-3: 2.5% MACE over next 6 weeks = Discharge Home  Score 4-6: 20.3% MACE over next 6 weeks = Admit for Clinical Observation  Score 7-10: 72.7% MACE over next 6 weeks = Early Invasive Strategies   “Chest Pain in the Emergency Room: A Multicenter

## 2024-11-22 NOTE — DISCHARGE INSTRUCTIONS
Take your medication as indicated and prescribed.  If you are given an antibiotic then, make sure you get the prescription filled and take the antibiotics until finished.  Drink plenty of water while taking the antibiotics.  Avoid drinking alcohol or drinks that have caffeine in it while taking antibiotics.   If you were given the medication pyridium (or take over the counter Azo) - do not wear any contacts for the next week since this medication will turn your tears an orange color and will stain the contacts.      For pain use acetaminophen (Tylenol) or ibuprofen (Motrin / Advil), unless prescribed medications that have acetaminophen or ibuprofen (or similar medications) in it.  You can take over the counter acetaminophen tablets (1 - 2 tablets of the 500-mg strength every 6 hours) or ibuprofen tablets (2 tablets every 4 hours).    PLEASE RETURN TO THE EMERGENCY DEPARTMENT IMMEDIATELY for worsening symptoms, inability to urinate, worsening of blood in your urine, or if you develop any concerning symptoms such as: high fever not relieved by acetaminophen (Tylenol) and/or ibuprofen (Motrin / Advil), chills, shortness of breath, chest pain, feeling of your heart fluttering or racing, persistent nausea and/or vomiting, vomiting up blood, blood in your stool, loss of consciousness, numbness, weakness or tingling in the arms or legs or change in color of the extremities, changes in mental status, persistent headache, blurry vision, loss of bladder / bowel.

## 2024-11-22 NOTE — ED PROVIDER NOTES
[facility] ED  Emergency Department Encounter  Emergency Medicine Resident     Pt Name:Elizabeth Maria  MRN: [id number]  Birthdate 1970  Date of evaluation: 11/22/24  PCP:  No primary care provider on file.  Note Started: 2:02 AM EST      CHIEF COMPLAINT       Chief Complaint   Patient presents with    Flank Pain    Diarrhea       HISTORY OF PRESENT ILLNESS  (Location/Symptom, Timing/Onset, Context/Setting, Quality, Duration, Modifying Factors, Severity.)      Elizabeth Maria is a 54 y.o. female with past medical history of multiple sclerosis, known thyroid and adrenal nodules, who presents with bilateral flank pain, significantly worse on the right side that has been ongoing and gradually worsening for the last 4 days.  Patient states that the pain radiates from her right flank over into her left flank and from her right flank into her right lower quadrant.  She reports associated nausea and episodes of vomiting, difficulty keeping any p.o. intake down.  Patient reports that the pain radiates into the right side of her chest.  No shortness of breath.  Was unable to take any pain medication at home due to not tolerating p.o.  Patient reports that this feels similar to her prior episodes of kidney stones.  She reports decreased urinary frequency due to feeling dehydrated but denies hematuria or dysuria.  Patient also reports multiple episodes per day of bloody diarrhea.  She states that the stool is completely loose and there was blood in the toilet after each bowel movement.  She has never had similar symptoms in the past.  She reports chills but no fever, congestion, rhinorrhea, cough.    PAST MEDICAL / SURGICAL / SOCIAL / FAMILY HISTORY      has a past medical history of Anxiety, Depression, Hypertension, Kidney stones, Multiple sclerosis (HCC), and Osteoporosis.       has a past surgical history that includes Hysterectomy (01/01/2005); Tonsillectomy and adenoidectomy (01/01/1978);  pain, though she may also have a viral illness or a muscle strain from excessive vomiting.  Will give antiemetics and pain medication for home.  Patient expressed understanding was agreeable with plan of care.  Strict return precautions provided.  Discharged home in stable condition. [JR]      ED Course User Index  [JR] Marie Alicia MD       PROCEDURES:  None    CONSULTS:  None    CRITICAL CARE:  There was significant risk of life threatening deterioration of patient's condition requiring my direct management. Critical care time 0 minutes, excluding any documented procedures.    FINAL IMPRESSION      1. Right flank pain    2. Acute cystitis without hematuria          DISPOSITION / PLAN     DISPOSITION Decision To Discharge 11/22/2024 05:17:41 AM           PATIENT REFERRED TO:  [facility] AT [address] [id number] [phone]        [address] [address]  [phone]          DISCHARGE MEDICATIONS:  New Prescriptions    CEPHALEXIN (KEFLEX) 500 MG CAPSULE    Take 1 capsule by mouth 2 times daily for 7 days    DICYCLOMINE (BENTYL) 10 MG CAPSULE    Take 1 capsule by mouth 4 times daily (before meals and nightly)    ONDANSETRON (ZOFRAN-ODT) 4 MG DISINTEGRATING TABLET    Take 1 tablet by mouth 3 times daily as needed for Nausea or Vomiting    TIZANIDINE (ZANAFLEX) 4 MG TABLET    Take 1 tablet by mouth every 6 hours as needed (Muscle pain)       Marie Alicia MD  Emergency Medicine Resident    (Please note that portions of thisnote were completed with a voice recognition program.  Efforts were made to edit the dictations but occasionally words are mis-transcribed.)

## 2024-11-23 LAB
MICROORGANISM SPEC CULT: NORMAL
SPECIMEN DESCRIPTION: NORMAL

## 2025-03-05 NOTE — ED NOTES
Dr. Coco Reyes at bedside for consult and evaluation.      Gladis Wilson RN  07/31/23 [id number] Quality 134: Screening For Clinical Depression And Follow-Up Plan: The patient was screened for depression and the screen was negative and no follow up required Quality 226: Preventive Care And Screening: Tobacco Use: Screening And Cessation Intervention: Patient screened for tobacco use and is an ex/non-smoker Detail Level: Detailed Quality 402: Tobacco Use And Help With Quitting Among Adolescents: Patient screened for tobacco and never smoked Quality 130: Documentation Of Current Medications In The Medical Record: Current Medications Documented Quality 431: Preventive Care And Screening: Unhealthy Alcohol Use - Screening: Patient not identified as an unhealthy alcohol user when screened for unhealthy alcohol use using a systematic screening method Quality 47: Advance Care Plan: Advance care planning not documented, reason not otherwise specified. Quality 110: Preventive Care And Screening: Influenza Immunization: Influenza Immunization Administered during Influenza season Duration Of Freeze Thaw-Cycle (Seconds): 3 Render Note In Bullet Format When Appropriate: No Consent: Verbal consent obtained from patient Number Of Freeze-Thaw Cycles: 1 freeze-thaw cycle Detail Level: Detailed